# Patient Record
Sex: FEMALE | Race: WHITE | NOT HISPANIC OR LATINO | Employment: UNEMPLOYED | URBAN - METROPOLITAN AREA
[De-identification: names, ages, dates, MRNs, and addresses within clinical notes are randomized per-mention and may not be internally consistent; named-entity substitution may affect disease eponyms.]

---
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
## 2022-03-11 ENCOUNTER — HISTORICAL (OUTPATIENT)
Dept: ADMINISTRATIVE | Facility: HOSPITAL | Age: 22
End: 2022-03-11

## 2022-04-07 ENCOUNTER — HISTORICAL (OUTPATIENT)
Dept: ADMINISTRATIVE | Facility: HOSPITAL | Age: 22
End: 2022-04-07
Payer: MEDICAID

## 2022-04-23 VITALS
HEIGHT: 60 IN | SYSTOLIC BLOOD PRESSURE: 112 MMHG | BODY MASS INDEX: 40.9 KG/M2 | WEIGHT: 208.31 LBS | DIASTOLIC BLOOD PRESSURE: 65 MMHG

## 2022-04-30 NOTE — ED PROVIDER NOTES
Patient:   Pierce Kerr             MRN: [id number]            FIN: [id number]               Age:   19 years     Sex:  Female     :  2000   Associated Diagnoses:   Chest pain; AP (abdominal pain); Bacterial urinary tract infection   Author:   Chantal Delgadillo      Basic Information   Time seen: Date & time 2019 02:02:00.   History source: Patient.   Arrival mode: Private vehicle.   History limitation: None.   Additional information: Chief Complaint from Nursing Triage Note : Chief Complaint   2019 21:59 CDT      Chief Complaint           Presents with c/o left sided chest pain that radiates to left upper abdominal quad. Onset around 2100 tonight while at work  .   Provider/Visit info:   Time Seen:  Chantal Delgadillo / 2019 01:58  .   History of Present Illness   The patient presents with 19-year-old female presents to the ED with the onset of chest pain that radiates to left upper quadrant abdominal pain that occurred around 9 PM.  Denies any nausea, vomiting or diarrhea.  She reports she began to feel dizzy and lightheaded symptoms occurred..  The onset was just prior to arrival.  The course/duration of symptoms is constant.  Radiating pain: none. The character of symptoms is tightness.  The degree at onset was minimal.  The degree at maximum was minimal.  The degree at present is minimal.  The exacerbating factor is none.  The relieving factor is none.  Risk factors consist of none.  Prior episodes: none.  Therapy today None.  Associated symptoms: none.        Review of Systems   Constitutional symptoms:  Negative except as documented in HPI.   Skin symptoms:  Negative except as documented in HPI.   Eye symptoms:  Negative except as documented in HPI.   ENMT symptoms:  Negative except as documented in HPI.   Respiratory symptoms:  Negative except as documented in HPI.   Cardiovascular symptoms:  Chest pain.   Gastrointestinal symptoms:  Abdominal pain.    Genitourinary symptoms:  Negative except as documented in HPI.   Musculoskeletal symptoms:  Negative except as documented in HPI.   Neurologic symptoms:  Negative except as documented in HPI.   Psychiatric symptoms:  Negative except as documented in HPI.   Endocrine symptoms:  Negative except as documented in HPI.   Hematologic/Lymphatic symptoms:  Negative except as documented in HPI.   Allergy/immunologic symptoms:  Negative except as documented in HPI.             Additional review of systems information: All other systems reviewed and otherwise negative.      Health Status   Allergies:    Allergic Reactions (Selected)  Severity Not Documented  Penicillin- Unknown..   Medications:  (Selected)   Documented Medications  Documented  DULoxetine 60 mg oral delayed release capsule: 60 mg = 1 cap(s), Oral, Daily  ESCITALOPRAM 20 MG TABLET: 20 mg = 1 tab(s), Oral, Daily  JUNEL FE 24 TABLET: 1 tab(s), Oral, Daily  LEVOTHYROXIN TAB 50MC mcg = 1 tab(s), Oral, Daily  LORAZEPAM    TAB 0.5M.5 mg = 1 tab(s), Oral, Daily  MIRTAZAPINE  TAB 15MG:   ONDANSETRON  TAB 8MG ODT:   OXCARBAZEPIN TAB 150MG:   OXCARBAZEPIN TAB 300MG:   RISPERIDONE  TAB 1M mg = 1 tab(s), Oral, qPM  escitalopram 10 mg oral tablet: 10 mg = 1 tab(s), Oral, qAM  lamoTRIgine 25 mg oral tablet:   risperidone 0.5 mg oral tablet: 0.5 mg = 1 tab(s), Oral, Daily  risperidone 2 mg oral tablet: 2 mg = 1 tab(s), Oral, Daily  risperidone 3 mg oral tablet: 3 mg = 1 tab(s), Oral, Daily.   Immunizations: Per nurse's notes.   Menstrual history: Last menstrual period: Date 2019.      Past Medical/ Family/ Social History   Medical history: Negative.   Surgical history:    No active procedure history items have been selected or recorded..   Family history: Not significant.   Social history:    Social & Psychosocial Habits    Alcohol  2019  Use: Never    Employment/School  2019  Status: Student    Tobacco  2018  Use: Never smoker    Patient  Wants Consult For Cessation Counseling N/A    02/20/2019  Use: Never (less than 100 in l    Patient Wants Consult For Cessation Counseling N/A    05/02/2019  Use: Never (less than 100 in l    Patient Wants Consult For Cessation Counseling N/A    05/08/2019  Use: Never (less than 100 in l    Patient Wants Consult For Cessation Counseling N/A    08/23/2019  Use: Never (less than 100 in l    Patient Wants Consult For Cessation Counseling N/A    Abuse/Neglect  08/23/2019  SHX Any signs of abuse or neglect No  , Alcohol use: Denies, Tobacco use: Denies, Drug use: Denies, Occupation: Unemployed.      Physical Examination               Vital Signs             Time:  9/14/2019 02:02:00.   Vital Signs   9/14/2019 1:14 CDT       Peripheral Pulse Rate     76 bpm                             Respiratory Rate          18 br/min                             SpO2                      100 %                             Systolic Blood Pressure   118 mmHg                             Diastolic Blood Pressure  79 mmHg                             Mean Arterial Pressure, Cuff              92 mmHg    9/13/2019 21:59 CDT      Temperature Oral          36.8 DegC                             Temperature Oral (calculated)             98.24 DegF                             Peripheral Pulse Rate     88 bpm                             Respiratory Rate          18 br/min                             SpO2                      97 %                             Systolic Blood Pressure   115 mmHg                             Diastolic Blood Pressure  80 mmHg  .   Basic Oxygen Information   9/14/2019 1:14 CDT       SpO2                      100 %    9/13/2019 21:59 CDT      SpO2                      97 %  .   General:  Alert, no acute distress.    Skin:  Warm, pink, intact.    Head:  Normocephalic, atraumatic.    Neck:  Supple, trachea midline, no tenderness.    Cardiovascular:  Regular rate and rhythm, No murmur, Normal peripheral perfusion.    Respiratory:   Lungs are clear to auscultation, respirations are non-labored, breath sounds are equal, Symmetrical chest wall expansion.    Back:  Nontender, Normal range of motion.    Musculoskeletal:  Normal ROM, normal strength.    Chest wall   Gastrointestinal:  Soft, Nontender, Non distended, Normal bowel sounds.    Neurological:  Alert and oriented to person, place, time, and situation, normal sensory observed, normal motor observed, normal speech observed.    Lymphatics:  No lymphadenopathy.   Psychiatric:  Cooperative, appropriate mood & affect, normal judgment.       Medical Decision Making   Documents reviewed:  Emergency department nurses' notes.   Results review:  Lab results : Lab View   9/14/2019 1:19 CDT       U beta hCG Ql POC         Negative    9/14/2019 1:00 CDT       Sodium Lvl                142 mmol/L                             Potassium Lvl             3.7 mmol/L                             Chloride                  106 mmol/L                             CO2                       32.0 mmol/L                             Calcium Lvl               9.1 mg/dL                             Glucose Lvl               98 mg/dL                             BUN                       12.0 mg/dL                             Creatinine                0.76 mg/dL                             Bili Total                0.2 mg/dL                             Bili Direct               0.10 mg/dL                             Bili Indirect             0.10 mg/dL                             AST                       21 unit/L                             ALT                       24 unit/L                             Alk Phos                  81 unit/L                             Total Protein             6.8 gm/dL                             Albumin Lvl               3.90 gm/dL                             Globulin                  2.90 gm/dL                             A/G Ratio                 1.3  NA                              Lipase Lvl                147 unit/L                             WBC                       6.6 x10(3)/mcL                             RBC                       4.39 x10(6)/mcL                             Hgb                       13.4 gm/dL                             Hct                       39.9 %                             Platelet                  218 x10(3)/mcL                             MCV                       90.9 fL                             MCH                       30.5 pg                             MCHC                      33.6 gm/dL                             RDW                       11.8 %                             MPV                       10.5 fL                             Abs Neut                  3.29 x10(3)/mcL                             Neutro Auto               50 %  NA                             Lymph Auto                35 %  NA                             Mono Auto                 11 %  NA                             Eos Auto                  3 %  NA                             Abs Eos                   0.2 x10(3)/mcL                             Basophil Auto             1 %  NA                             Abs Neutro                3.29 x10(3)/mcL                             Abs Lymph                 2.3 x10(3)/mcL                             Abs Mono                  0.7 x10(3)/mcL                             Abs Baso                  0.0 x10(3)/mcL    9/14/2019 0:56 CDT       UA Appear                 CLOUDY                             UA Color                  YELLOW                             UA Spec Grav              1.021                             UA Bili                   Negative                             UA pH                     6.5                             UA Urobilinogen           0.2                             UA Blood                  Negative                             UA Glucose                Negative                             UA Ketones                 Negative                             UA Protein                Negative                             UA Nitrite                Negative                             UA Leuk Est               1+                             UA WBC                    30-40 /HPF                             UA RBC                    5-10 /HPF                             UA Bacteria               1+ /HPF                             UA Squam Epithelial       Few  .   Radiology results:  No acute findings noted on x-ray.      Reexamination/ Reevaluation   Time: 9/14/2019 02:10:00 .   Vital signs   results included from flowsheet : Vital Signs   9/14/2019 1:14 CDT       Peripheral Pulse Rate     76 bpm                             Respiratory Rate          18 br/min                             SpO2                      100 %                             Systolic Blood Pressure   118 mmHg                             Diastolic Blood Pressure  79 mmHg                             Mean Arterial Pressure, Cuff              92 mmHg     Interventions: No leukocytosis noted will place on antibiotic for UTI   She has no acute abdomen appreciated on exam.  He appears nontoxic.  Instructed to  follow-up with her PCP on Monday..      Impression and Plan   Diagnosis   Chest pain ([id number] R07.9)   AP (abdominal pain) ([id number] R10.9)   Bacterial urinary tract infection ([id number] N39.0)   Plan   Condition: Improved, Stable.    Disposition: Discharged: Time  9/14/2019 02:12:00, to home.    Prescriptions: Launch prescriptions   Pharmacy:  Bactrim  mg-160 mg oral tablet (Prescribe): 1 tab(s), Oral, BID, X 10 day(s), # 20 tab(s), 0 Refill(s), Pharmacy: [facility] #[id number]  ketorolac 10 mg oral tablet (Prescribe): 10 mg = 1 tab(s), Oral, q6hr, PRN PRN pain  for pain, X 5 day(s), # 20 tab(s), 0 Refill(s), Pharmacy: [facility] #[id number].    Patient was given the following educational materials: Abdominal Pain, Adult, Easy-to-Read, Nonspecific  Chest Pain, Easy-to-Read.    Follow up with: Take Tylenol/Motrin every 4 hours tip alleviate pain and fever.; Please remove packing in 2-3 days Call for followup appointment with PCP on Monday.    Counseled: Patient, Regarding diagnosis, Regarding diagnostic results, Regarding treatment plan, Regarding prescription, Patient indicated understanding of instructions.

## 2022-05-02 NOTE — HISTORICAL [facility] CERNER
This is a historical note converted from [facility]. Formatting and pictures may have been removed.  Please reference F[facility]pe for original formatting and attached multimedia. Chief Complaint  Dizziness, went to Cardiologist today, has low BP and pulse  History of Present Illness  Patient has had dizziness since last Thursday. Saw Pediatrician on Friday, Dr Ellis, in [place] due to severe abdominal pain. Dx by exam with gastritis and started on Omeprazole 20mg and Carafate.? Abd pain improved by Saturday night. Had EGD when 16 yo, no ulcer then.  Saw Cardiology due to hx of pericardial effusion. It has resolved.  Went to ER at [facility] on Monday. Labs: glucose 89, creatinine 0.4,??ALT 15, AST 18, Hgb 12.4, HCT 36, WBC 6.3 , CRP 9.28 ( usually runs around 6 )  ?  PSHX: [facility], Tonsillectomy  Endo : [place], last labs 3 months ago.  LMP 1 month ago, takes BCP. Normal amount of bleeding with menses.  Review of Systems  See HPI. ?Denies cardiac or respiratory complaints. ?Loose stool yesterday. Very hard stool with bleeding on Saturday.? He denies? complaints.? She does have sinus congestion last several days.  Physical Exam  Vitals & Measurements  HR:?84(Peripheral)? BP:?100/62?  HT:?152?cm? WT:?80.4?kg? BMI:?34.8?  General :?????Well-developed and? nourished, no apparent distress, alert and oriented ?4  HEENT:????? TMs and EACs within normal limits,?edematous?nares with erythema and purulent discharge.? OP with erythema?but no exudate.  Integument :????? Skin is intact with no erythema.? No pustules or vesicles.? No rash or scale. No Lymphadenopathy.? No urticaria.? No abnormal nevi  Neck :?????Supple, no lymphadenopathy, no thyromegaly, no bruits, no jugular venous distention  Cardiovascular :?????? Regular rhythm and rate, no murmurs, radial and dorsal pedal pulses 2+ bilaterally  Respiratory :??????Lungs clear to auscultation bilaterally, no wheezes, no crackles, no rhonchi.? Good air movement  Abdomen :?????? ?NABS, soft,  obese abdomen,?epigastric tenderness, no hepatosplenomegaly, no masses, no guarding or rebound????????????????????????  Ext:??????? No muscle tenderness, joints WNL, FROM, negative SLR, no?CCE  Neuro :? ?????? CN II-XII intact, reflexes 2+ throughout, no motor sensory deficits, negative?cerebellar? tests  Heme :?????? No bruising or petechiae?  Back:??????? no CVAT  Assessment/Plan  1.?Dizziness?R42  ?May be due to #3. ?Encouraged to increase her water intake.  2.?Abdominal pain?R10.9  ?Discussed bland diet.  3.?Sinusitis?J32.9  ?We will prescribe 10 days of Omnicef.  4.?Hypothyroidism?E03.9  ?Okay TSH, FT3, and FT4 today. ?Follow-up to be determined.  5.?IBS (irritable bowel syndrome)?K58.9  ?We will place referral to [facility]  6.?Gastritis?K29.70  H. pylori was negative.??Can increase Omeprazole to 40mg if needed.  Referrals  External Referral, Abdominal pain and IBS symptoms, Dietician, [facility], 05/08/19 15:35:00 CDT, IBS (irritable bowel syndrome)   Problem List/Past Medical History  Ongoing  Obesity  Historical  No qualifying data  Medications  CYCLOBENZAPR TAB 5MG,? ?Not taking  DICLOFEN POT TAB 50MG, 50 mg= 1 tab(s), Oral, BID,? ?Not taking  Diprolene 0.05% topical ointment, 1 golden, TOP, BID, 3 refills,? ?Not taking  DULoxetine 60 mg oral delayed release capsule, 60 mg= 1 cap(s), Oral, Daily  escitalopram 10 mg oral tablet, 10 mg= 1 tab(s), Oral, qAM,? ?Not taking  ESCITALOPRAM 20 MG TABLET, 20 mg= 1 tab(s), Oral, Daily,? ?Not taking  Fioricet oral capsule, See Instructions, PRN, 1 refills  JUNEL FE 24 TABLET, 1 tab(s), Oral, Daily  lamoTRIgine 25 mg oral tablet  LEVOTHYROXIN TAB 50MCG, 50 mcg= 1 tab(s), Oral, Daily  LORAZEPAM TAB 0.5MG, 0.5 mg= 1 tab(s), Oral, Daily  MIRTAZAPINE TAB 15MG  OMEPRAZOLE 40MG CAPSULES, 40 mg= 1 cap(s), Oral, Daily,? ?Not taking  ONDANSETRON TAB 8MG ODT,? ?Not taking  OXCARBAZEPIN TAB 150MG  OXCARBAZEPIN TAB 300MG  Pepcid 20 mg oral tablet, 20 mg= 1 tab(s), Oral, BID, 5  refills,? ?Not taking  Prilosec 20 mg oral DR capsule, 20 mg= 1 cap(s), Oral, Daily  RISPERIDONE TAB 1MG, 1 mg= 1 tab(s), Oral, qPM,? ?Not taking  risperidone 2 mg oral tablet, 2 mg= 1 tab(s), Oral, Daily  Allergies  penicillin?(Unknown)  Social History  Alcohol  Never, 05/02/2019  Tobacco  Never (less than 100 in lifetime), N/A, 05/08/2019  Never (less than 100 in lifetime), N/A, 05/02/2019  Never (less than 100 in lifetime), N/A, 02/20/2019  Never smoker, N/A, 11/28/2018  Health Maintenance  Health Maintenance  ???Pending?(in the next year)  ??? ??OverDue  ??? ? ? ?Adolescent Depression Screening due??01/01/19??and every 1??year(s)  ???Satisfied?(in the past 1 year)  ??? ??Satisfied?  ??? ? ? ?Body Mass Index Check on??05/08/19.??Satisfied by SYSTEM  ??? ? ? ?Influenza Vaccine on??02/20/19.??Satisfied by Nessa TORRE, No ROMAN  ?  ?  Lab Results  Test Name Test Result Date/Time   H pylori Ab Negative 05/08/2019 15:09 CDT

## 2022-05-02 NOTE — HISTORICAL [facility] CERNER
This is a historical note converted from [facility]. Formatting and pictures may have been removed.  Please reference Filipe for original formatting and attached multimedia. Chief Complaint  BACK/RIBS. WORST ON THE RIGHT SIDE WITH NAUSEA  History of Present Illness  Patient with pain in right lower rib area since yesterday. ?Eyes trauma?to ribs. ?Intensity waxes. Had Cholecystectomy ?in March.  She was seen by Dr. Vargas 2 weeks ago and had an EGD on 10-31-19. Scheduled for Colonoscopy next Tuesday.? He ordered an MRI due to possible retained gallstones, waiting on insurance approval. ? Negative H Pylori in May.  BM yesterday didnt effect pain.  Review of Systems  See HPI. ?Denies fever. ?Cough for a few days.  Physical Exam  Vitals & Measurements  HR:?80(Peripheral)? BP:?119/63?  HT:?152?cm? WT:?87.2?kg? BMI:?37.74?  General :?????Well-developed and? nourished, no apparent distress, alert and oriented ?4  HEENT:????? TMs and EACs within normal limits,?nasal mucosa within normal limits?with no discharge,?oropharynx clear  Integument :?????No pustules or vesicles.? No rash or scale. No Lymphadenopathy.??  Neck :?????Supple, no lymphadenopathy, no thyromegaly, no bruits, no jugular venous distention  Cardiovascular :?????? Regular rhythm and rate, no murmurs, radial and dorsal pedal pulses 2+ bilaterally  Respiratory :??????Lungs clear to auscultation bilaterally, no wheezes, no crackles, no rhonchi.? Good air movement  Thorax:?Tender palpation of lower ribs?and intercostal spaces, exacerbated with twisting.? No masses appreciated.  Abdomen :?????? ?NABS, soft, nontender, no hepatosplenomegaly, no masses, no guarding or rebound????????????????????????  Ext:??????? No muscle tenderness, joints WNL, FROM, negative SLR, no?CCE  Heme :?????? No bruising or petechiae?  Back:??????? no CVAT  ?  X-ray ribs: No fracture appreciated.? PA view of the chest shows no infiltrate  Assessment/Plan  1.?Abdominal pain?R10.9  ?Advised  patient to proceed with work-up already scheduled.  2.?Rib pain?R07.81  ?No fracture seen on x-ray. ?No evidence of shingles or infection.? Likely intercostal muscle strain.? Prescribed Flexeril.? Patient to contact us if symptoms worsen.   Problem List/Past Medical History  Ongoing  Fatigue  Obesity  Historical  No qualifying data  Medications  DULoxetine 60 mg oral delayed release capsule, 60 mg= 1 cap(s), Oral, Daily  escitalopram 10 mg oral tablet, 10 mg= 1 tab(s), Oral, qAM  ESCITALOPRAM 20 MG TABLET, 20 mg= 1 tab(s), Oral, Daily  Flexeril 5 mg oral tablet, See Instructions  hyoscyamine 0.125 mg oral tablet, 0.125 mg= 1 tab(s), Oral, QID, PRN  JUNEL FE 24 TABLET, 1 tab(s), Oral, Daily  lamoTRIgine 25 mg oral tablet  LEVOTHYROXIN TAB 50MCG, 50 mcg= 1 tab(s), Oral, Daily  LORAZEPAM TAB 0.5MG, 0.5 mg= 1 tab(s), Oral, Daily  MIRTAZAPINE TAB 15MG  ONDANSETRON TAB 8MG ODT,? ?Not taking  OXCARBAZEPIN TAB 150MG  OXCARBAZEPIN TAB 300MG  RISPERIDONE TAB 1MG, 1 mg= 1 tab(s), Oral, qPM  risperidone 0.5 mg oral tablet, 0.5 mg= 1 tab(s), Oral, Daily  risperidone 2 mg oral tablet, 2 mg= 1 tab(s), Oral, Daily  risperidone 3 mg oral tablet, 3 mg= 1 tab(s), Oral, Daily  selenium sulfide 2.25% topical shampoo, 1 golden, TOP, 2x/Wk, 11 refills  Allergies  penicillin?(Unknown)  Social History  Abuse/Neglect  No, 11/19/2019  No, 10/11/2019  No, 08/23/2019  Alcohol  Never, 05/02/2019  Employment/School  Student, 08/23/2019  Tobacco  Never (less than 100 in lifetime), No, 11/19/2019  Never (less than 100 in lifetime), N/A, 10/11/2019  Never (less than 100 in lifetime), N/A, 08/23/2019  Never (less than 100 in lifetime), N/A, 05/08/2019  Never (less than 100 in lifetime), N/A, 05/02/2019  Never (less than 100 in lifetime), N/A, 02/20/2019  Never smoker, N/A, 11/28/2018  Immunizations  Vaccine Date Status   hepatitis B adult vaccine 08/23/2019 Given   varicella virus vaccine 08/23/2019 Given   varicella virus vaccine 08/23/2019 Recorded    hepatitis B vaccine 08/23/2019 Recorded   meningococcal conjugate vaccine 09/19/2018 Recorded   hepatitis A pediatric vaccine 09/19/2018 Recorded   hepatitis A pediatric vaccine 08/31/2017 Recorded   tetanus/diphtheria/pertussis, acel(Tdap) 06/26/2013 Recorded   meningococcal conjugate vaccine 06/26/2013 Recorded   influenza virus vaccine, live, trivalent 09/22/2011 Recorded   influenza virus vaccine, live, trivalent 10/11/2010 Recorded   influenza virus vaccine, live, trivalent 10/23/2009 Recorded   influenza virus vaccine, live, trivalent 10/25/2007 Recorded   measles/mumps/rubella virus vaccine 10/08/2007 Recorded   poliovirus vaccine, inactivated 09/01/2006 Recorded   measles/mumps/rubella virus vaccine 09/01/2006 Recorded   measles/mumps/rubella virus vaccine 06/03/2004 Recorded   varicella virus vaccine 05/07/2004 Recorded   pneumococcal 7-valent vaccine 05/07/2004 Recorded   hepatitis B pediatric vaccine 05/07/2004 Recorded   hepatitis B vaccine 05/07/2004 Recorded   poliovirus vaccine, inactivated 03/05/2004 Recorded   poliovirus vaccine, inactivated 07/30/2003 Recorded   pneumococcal 7-valent vaccine 07/30/2003 Recorded   poliovirus vaccine, inactivated 2002 Recorded   pneumococcal 7-valent vaccine 2002 Recorded   haemophilus b-hepatitis B vaccine 2002 Recorded   hepatitis B pediatric vaccine 2002 Recorded   hepatitis B pediatric vaccine 2002 Recorded   Health Maintenance  Health Maintenance  ???Pending?(in the next year)  ??? ??OverDue  ??? ? ? ?Adolescent Depression Screening due??01/01/19??and every 1??year(s)  ???Satisfied?(in the past 1 year)  ??? ??Satisfied?  ??? ? ? ?Body Mass Index Check on??11/19/19.??Satisfied by Ina Bearden CMA  ??? ? ? ?Influenza Vaccine on??11/19/19.??Satisfied by Ina Bearden CMA  ?  Lab Results  Test Name Test Result Date/Time   WBC 5.4 x10(3)/mcL 11/19/2019 16:04 CST   Hgb 13.0 gm/dL 11/19/2019 16:04 CST   Hct 36.8 % (Low)  11/19/2019 16:04 CST

## 2022-05-02 NOTE — HISTORICAL [facility] CERNER
This is a historical note converted from [employer]. Formatting and pictures may have been removed.  Please reference Filipe for original formatting and attached multimedia. Chief Complaint  ABD PAIN X 2DAYS  History of Present Illness  Patient with abdominal pain for 4-6 weeks. Worsened last 2 days. Located at 4 spots, BLQ and BUQ.  Had Cholecystectomy in 2019.  EGD , Colonoscopy  with Dr Vargas.? Had erosive gastritis in 2019 and hemorrhoid.  Saw dietician in 2019, never had a SIBO test.  Review of Systems  General:??????????????? Patient reports energy level is fair.??Denies fever,chills, night sweats, fatigue or weakness.  HEENT:?????????????????? Denies vision changes or eye pain.? No sore throat, ear pain, sinus pressure or discharge.  Cardiovascular:??? Denies chest pain, palpitations, dyspnea on exertion, orthopnea.  Respiratory:???????? No cough, wheezing, shortness of breath, or sputum.  GI:????????????????????????? See HPI. ?Denies melena or hematochezia.? Occasional constipation diarrhea.  :??????????????????????? Urinary frequency. No urgency, hematuria, discharge, or incontinence  MS:?????????????????????? Denies myalgias, arthralgias, joint effusion, edema, or weakness  Neuro:????????????????? No headaches, numbness in extremities, tingling, dizziness, or weakness  Psych:?????????????????? Denies anxiety, depression, suicidal or homicidal ideations, or irritability  Endo:???????????????????She would like to recheck thyroid numbers. Has been off levothyroxine since May. Denies polyuria, polydipsia, polyphagia  Heme:?????????????????Would like to have CBC and serum iron levels checked.? Has history of anemia.??No abnormal bleeding or bruising. No lymph node enlargement or pain.  Physical Exam  Vitals & Measurements  HR:?114(Peripheral)? BP:?110/80?  HT:?152.00?cm? WT:?88.500?kg? BMI:?38.31? LMP:?09/11/2020 00:00 CDT?  General: Patient is alert and oriented, NAD  Neck:?Supple,?no LAD,?no  thyromegaly,?no carotid bruits  CV:?Regular rate and rhythm, no murmur  Lungs:?No rhonchi or wheezes or crackles, good air movement  ABD:?Moderate tenderness in bilateral upper and bilateral lower quadrants, no rebound or guarding, no masses, NABS  Back: No CVAT  EXT: 2+ DP and radial pulses bilaterally, no CCE  Skin: Intact  Assessment/Plan  1.?Abdominal pain?R10.9  ?We will check a CMP.? Sent order to respiratory clinic at [facility] in [place]?for a SIBO test.? Follow-up to be determined.  2.?Hypothyroid?E03.9  We will check a TSH, FT3, and FT4.  3.?Anemia?D64.9  ?We will check a CBC  4.?Urinary frequency?R35.0  ?We will check a UA.  Referrals  Clinic Follow-up PRN, 09/17/20 11:44:00 CDT, [facility], Future Order   Problem List/Past Medical History  Ongoing  Fatigue  Hypothyroid  Narcolepsy  Obesity  Historical  No qualifying data  Medications  amitriptyline 25 mg oral tablet, 25 mg= 1 tab(s), Oral, qPM  cyclobenzaprine 5 mg oral tablet, See Instructions,? ?Not taking  diclofenac sodium 25 mg oral delayed release tablet, See Instructions  DULoxetine 60 mg oral delayed release capsule, 60 mg= 1 cap(s), Oral, Daily  ethinyl estradiol-norgestimate triphasic 35 mcg oral tablet, 1 tab(s), Oral, Daily  hyoscyamine 0.125 mg sublingual tablet, 0.125 mg= 1 tab(s), SL, q8hr  JUNEL FE 24 TABLET, 1 tab(s), Oral, Daily  lamoTRIgine 200 mg oral tablet, 200 mg= 1 tab(s), Oral, Daily  lamoTRIgine 25 mg oral tablet, 25 mg= 1 tab(s), Oral, BID,? ?Not taking  LEVOTHYROXIN TAB 50MCG, 50 mcg= 1 tab(s), Oral, Daily  LORAZEPAM TAB 0.5MG, 0.5 mg= 1 tab(s), Oral, Daily,? ?Still taking, not as prescribed: PRN  methocarbamol 500 mg oral tablet, 500 mg= 1 tab(s), Oral, Daily  mirtazapine 7.5 mg oral tablet, 7.5 mg= 1 tab(s), Oral, qPM  modafinil 100 mg oral tablet, 100 mg= 1 tab(s), Oral, qAM, 5 refills  ONDANSETRON TAB 8MG ODT, 8 mg= 1 tab(s), Oral, TID  OXCARBAZEPIN TAB 150MG, 150 mg= 1 tab(s), Oral, BID  OXCARBAZEPIN TAB  300MG, 300 mg= 1 tab(s), Oral, BID  risperidone 0.5 mg oral tablet, 0.5 mg= 1 tab(s), Oral, Daily  risperidone 3 mg oral tablet, 3 mg= 1 tab(s), Oral, Daily  Zithromax Z-Christiano 250 mg oral tablet, See Instructions,? ?Not taking  Allergies  Tape?(Rash)  penicillin?(Unknown)  Social History  Abuse/Neglect  No, 06/08/2020  No, 12/20/2019  Alcohol  Never, 05/02/2019  Employment/School  Student, 08/23/2019  Substance Use  Never, 11/25/2019  Tobacco  Never (less than 100 in lifetime), No, 06/08/2020  Never (less than 100 in lifetime), N/A, 12/20/2019  Immunizations  Vaccine Date Status   hepatitis B adult vaccine 08/23/2019 Given   varicella virus vaccine 08/23/2019 Given   varicella virus vaccine 08/23/2019 Recorded   hepatitis B vaccine 08/23/2019 Recorded   meningococcal conjugate vaccine 09/19/2018 Recorded   hepatitis A pediatric vaccine 09/19/2018 Recorded   hepatitis A pediatric vaccine 08/31/2017 Recorded   tetanus/diphtheria/pertussis, acel(Tdap) 06/26/2013 Recorded   meningococcal conjugate vaccine 06/26/2013 Recorded   influenza virus vaccine, live, trivalent 09/22/2011 Recorded   influenza virus vaccine, live, trivalent 10/11/2010 Recorded   influenza virus vaccine, live, trivalent 10/23/2009 Recorded   influenza virus vaccine, live, trivalent 10/25/2007 Recorded   measles/mumps/rubella virus vaccine 10/08/2007 Recorded   poliovirus vaccine, inactivated 09/01/2006 Recorded   measles/mumps/rubella virus vaccine 09/01/2006 Recorded   measles/mumps/rubella virus vaccine 06/03/2004 Recorded   varicella virus vaccine 05/07/2004 Recorded   pneumococcal 7-valent vaccine 05/07/2004 Recorded   hepatitis B pediatric vaccine 05/07/2004 Recorded   hepatitis B vaccine 05/07/2004 Recorded   poliovirus vaccine, inactivated 03/05/2004 Recorded   poliovirus vaccine, inactivated 07/30/2003 Recorded   pneumococcal 7-valent vaccine 07/30/2003 Recorded   poliovirus vaccine, inactivated 2002 Recorded   pneumococcal 7-valent  vaccine 2002 Recorded   haemophilus b-hepatitis B vaccine 2002 Recorded   hepatitis B pediatric vaccine 2002 Recorded   hepatitis B pediatric vaccine 2002 Recorded   Health Maintenance  Health Maintenance  ???Pending?(in the next year)  ??? ??OverDue  ??? ? ? ?Alcohol Misuse Screening due??01/02/20??and every 1??year(s)  ??? ??Due?  ??? ? ? ?ADL Screening due??09/20/20??and every 1??year(s)  ??? ? ? ?Influenza Vaccine due??09/20/20??and every?  ??? ??Due In Future?  ??? ? ? ?Obesity Screening not due until??01/01/21??and every 1??year(s)  ??? ? ? ?Blood Pressure Screening not due until??09/17/21??and every 1??year(s)  ??? ? ? ?Body Mass Index Check not due until??09/17/21??and every 1??year(s)  ???Satisfied?(in the past 1 year)  ??? ??Satisfied?  ??? ? ? ?Blood Pressure Screening on??09/17/20.??Satisfied by Aleks John  ??? ? ? ?Body Mass Index Check on??09/17/20.??Satisfied by Aleks John  ??? ? ? ?Influenza Vaccine on??12/20/19.??Satisfied by Nancy Jay  ??? ? ? ?Obesity Screening on??09/17/20.??Satisfied by Aleks John  ?

## 2022-05-02 NOTE — HISTORICAL [facility] CERN[facility]
This is a historical note converted from Filipe. Formatting and pictures may have been removed.  Please reference Filipe for original formatting and attached multimedia. Chief Complaint  RIGHT SIDE LOWER ABD PAIN  History of Present Illness  For the past month she has RLQ and LLQ pain. The pain is daily and?intermittent. The pain feels like shes being stabbed with a needle. She has frequent diarrhea that has not worsened or changed in the past month. She denies a fever, n/v, melena/hematochezia.  ?   She is currently being followed by a cardiologist for heart palpitations and intermittent chest pain. She will have an OV in the next month to discuss the results of a halter monitor she wore for the past month.  ?   Received birth control implant around November, 2020. GYN: Dr. Arana at [facility].  ?   She does have a history of SIBO and currently using Xifaxan and metronidazole for treatment.  Had Cholecystectomy in 2019.  EGD , Colonoscopy  with Dr Vargas.? Had erosive gastritis in 2019 and hemorrhoid.  Review of Systems  General:?? Denies weight change.??Denies fever,chills, night sweats, or weakness.  Cardiovascular:?? Denies?dyspnea on exertion, orthopnea.  Respiratory:?? No cough, wheezing, shortness of breath, or sputum.  GI:?? Denies nausea, emesis, constipation, melena, hematochezia  :?? No frequency, urgency, hematuria, discharge, or incontinence  MS:?? Denies myalgias, arthralgias, joint effusion, edema, or weakness  Neuro:?? No headaches, numbness in extremities, tingling, dizziness, or weakness  ?  ?  Physical Exam  Vitals & Measurements  HR:?100(Peripheral)? BP:?116/69?  HT:?152.00?cm? HT:?152?cm? WT:?90.700?kg? WT:?90.7?kg? BMI:?39.26?  General:?? Well-developed and??nourished, no apparent distress, alert and oriented??4.  HEENT:?? PERRLA  Neck:?? Supple, no lymphadenopathy, no thyromegaly, no bruits, no jugular venous distention.  Cardiovascular:?? Regular rhythm and  rate, no murmurs, radial pulses 2+ bilaterally.  Respiratory:?? Lungs clear to auscultation bilaterally, no wheezes, no crackles, no rhonchi.??Good air movement.  Abdomen:??NABS, soft.??Tenderness to RUQ, RLQ, LLQ - tenderness severity varies from mild to moderate, tender points also change in severity throughout abdominal exam. Mild guarding in lower quadrants. No rebound tenderness.?No hepatosplenomegaly, no masses.?  MS:?? No CCE.  Neuro:?? CN II-XII intact_  Psych: Normal affect, patient calm, good historian, patient answers questions?appropriately. Good hygiene.  Heme:? No bruising or petechiae.  ?  Assessment/Plan  1.?Abdominal pain?R10.9  Lab today: CBC, CMP, UA  ER precautions.  Will determine next step in evaluation after review of lab results.  Ordered:  1036F Current tobacco non-user, 05/12/21 14:18:00 CDT  1111F- Medication reconciliation completed within 30 days of an inpatient discharge to home, 05/12/21 14:18:00 CDT  1170F Functional Status Assessment, 05/12/21 14:18:00 CDT  3008F Body Mass Index (BMI), documented, 05/12/21 14:18:00 CDT  3074F Most recent systolic blood pressure, less than 130 mm Hg, 05/12/21 14:18:00 CDT  3078F Most recent diastolic blood pressure, less than 80 mm Hg, 05/12/21 14:18:00 CDT  Clinic Follow-up PRN, 05/12/21 14:18:00 CDT, [facility], Future Order  Office/Outpatient Visit Level 3 Established 04297 PC, Abdominal pain  Diarrhea, [facility], 05/12/21 14:18:00 CDT  ?  2.?Diarrhea?R19.7  ?See #1  Ordered:  1036F Current tobacco non-user, 05/12/21 14:18:00 CDT  1111F- Medication reconciliation completed within 30 days of an inpatient discharge to home, 05/12/21 14:18:00 CDT  1170F Functional Status Assessment, 05/12/21 14:18:00 CDT  3008F Body Mass Index (BMI), documented, 05/12/21 14:18:00 CDT  3074F Most recent systolic blood pressure, less than 130 mm Hg, 05/12/21 14:18:00 CDT  3078F Most recent diastolic blood pressure, less than 80 mm Hg, 05/12/21 14:18:00  CDT  Clinic Follow-up PRN, 05/12/21 14:18:00 CDT, [facility], Future Order  Office/Outpatient Visit Level 3 Established 27545 PC, Abdominal pain  Diarrhea, [facility], 05/12/21 14:18:00 CDT  ?  3.?Hypothyroid?E03.9  ?Lab today: TSH  ?  Referrals  Clinic Follow-up PRN, 05/12/21 14:18:00 CDT, [facility], Future Order   Problem List/Past Medical History  Ongoing  Cholecystitis  Fatigue  History of cholecystectomy  Hypothyroid  Narcolepsy  Obesity  Historical  No qualifying data  Medications  DULoxetine 60 mg oral delayed release capsule, 60 mg= 1 cap(s), Oral, Daily  lamoTRIgine 200 mg oral tablet, 200 mg= 1 tab(s), Oral, Daily  LORAZEPAM TAB 0.5MG, 0.5 mg= 1 tab(s), Oral, Daily,? ?Still taking, not as prescribed: PRN  metroNIDAZOLE 250 mg oral tablet, 250 mg= 1 tab(s), Oral, TID, 1 refills  mirtazapine 15 mg oral tablet, 15 mg= 1 tab(s), Oral, qPM  modafinil 100 mg oral tablet, 100 mg= 1 tab(s), Oral, BID, 5 refills  Nexplanon 68 mg subcutaneous implant, 68 mg= 1 EA, Subcutaneous, Once  risperidone 0.5 mg oral tablet, 0.5 mg= 1 tab(s), Oral, Daily  risperidone 3 mg oral tablet, 3 mg= 1 tab(s), Oral, Daily  Xifaxan 550 mg oral tablet, 550 mg= 1 tab(s), Oral, TID, 1 refills  Allergies  penicillins?(Urticaria)  Adhesive Bandage?(unknown)  Tape?(Rash)  penicillin?(Unknown)  Social History  Abuse/Neglect  No, 05/12/2021  No, 03/25/2021  Alcohol  Never, 05/02/2019  Employment/School  Student, 08/23/2019  Substance Use  Never, 11/25/2019  Tobacco  Never (less than 100 in lifetime), No, 05/12/2021  Never (less than 100 in lifetime), N/A, 03/25/2021  Immunizations  Vaccine Date Status   hepatitis B adult vaccine 08/23/2019 Given   varicella virus vaccine 08/23/2019 Given   varicella virus vaccine 08/23/2019 Recorded   hepatitis B vaccine 08/23/2019 Recorded   meningococcal conjugate vaccine 09/19/2018 Recorded   hepatitis A pediatric vaccine 09/19/2018 Recorded   hepatitis A pediatric vaccine 08/31/2017  Recorded   tetanus/diphtheria/pertussis, acel(Tdap) 06/26/2013 Recorded   meningococcal conjugate vaccine 06/26/2013 Recorded   influenza virus vaccine, live, trivalent 09/22/2011 Recorded   influenza virus vaccine, live, trivalent 10/11/2010 Recorded   influenza virus vaccine, live, trivalent 10/23/2009 Recorded   influenza virus vaccine, live, trivalent 10/25/2007 Recorded   measles/mumps/rubella virus vaccine 10/08/2007 Recorded   poliovirus vaccine, inactivated 09/01/2006 Recorded   measles/mumps/rubella virus vaccine 09/01/2006 Recorded   measles/mumps/rubella virus vaccine 06/03/2004 Recorded   hepatitis B vaccine 05/07/2004 Recorded   varicella virus vaccine 05/07/2004 Recorded   pneumococcal 7-valent vaccine 05/07/2004 Recorded   hepatitis B pediatric vaccine 05/07/2004 Recorded   poliovirus vaccine, inactivated 03/05/2004 Recorded   poliovirus vaccine, inactivated 07/30/2003 Recorded   pneumococcal 7-valent vaccine 07/30/2003 Recorded   poliovirus vaccine, inactivated 2002 Recorded   pneumococcal 7-valent vaccine 2002 Recorded   haemophilus b-hepatitis B vaccine 2002 Recorded   hepatitis B pediatric vaccine 2002 Recorded   hepatitis B pediatric vaccine 2002 Recorded   Health Maintenance  Health Maintenance  ???Pending?(in the next year)  ??? ??OverDue  ??? ? ? ?Influenza Vaccine due??10/01/20??and every 1??day(s)  ??? ? ? ?Alcohol Misuse Screening due??01/02/21??and every 1??year(s)  ??? ??Due?  ??? ? ? ?ADL Screening due??05/13/21??and every 1??year(s)  ??? ??Due In Future?  ??? ? ? ?Obesity Screening not due until??01/01/22??and every 1??year(s)  ??? ? ? ?Depression Screening not due until??03/25/22??and every 1??year(s)  ??? ? ? ?Blood Pressure Screening not due until??05/12/22??and every 1??year(s)  ??? ? ? ?Body Mass Index Check not due until??05/12/22??and every 1??year(s)  ???Satisfied?(in the past 1 year)  ??? ??Satisfied?  ??? ? ? ?Blood Pressure Screening  on??05/12/21.??Satisfied by Ina Bearden CMA  ??? ? ? ?Body Mass Index Check on??05/12/21.??Satisfied by Ina Bearden CMA.  ??? ? ? ?Depression Screening on??03/25/21.??Satisfied by Nancy Jay  ??? ? ? ?Influenza Vaccine on??03/25/21.??Satisfied by Nancy Jay  ??? ? ? ?Obesity Screening on??05/12/21.??Satisfied by Ina Bearden CMA.  ?      Patient condition discussed?in detail with nurse practitioner.??Agree with plan of care?and follow-up.  ?

## 2022-05-02 NOTE — HISTORICAL [facility] CERNER
This is a historical note converted from Filipe. Formatting and pictures may have been removed.  Please reference Filipe for original formatting and attached multimedia. Chief Complaint  REFERRAL FOR SLEEP STUDY, DANDRUFF  History of Present Illness  Patient presents with mother. She is hypersomnolent for months. Falls asleep in car on way to school and in class. Finally wakes up somewhat about 9:30. Father has narcolepsy. Sees Dr Light, psychiatrist, ?at [facility] every 3 months. No psych medication changes in at least a year. Went to ER? on 9-14-9 for abdominal and chest pain. In process of GI work up.  Also, rash on scalp, eyebrows, ears, and nose. Failed OTC meds.  Declines Flu vaccine.  Review of Systems  General:??????????????See HPI.? History of anemia?3 years ago  HEENT:?????????????????? Denies vision changes or eye pain.? No sore throat, ear pain, sinus pressure or discharge.  Cardiovascular:??? Denies chest pain, palpitations, dyspnea on exertion, orthopnea.  Respiratory:???????? No cough, wheezing, shortness of breath, or sputum.  GI:????????????????????????? Denies nausea, emesis, constipation, diarrhea, melena, hematochezia or abdominal pain  :??????????????????????? No frequency, urgency, hematuria, discharge, or incontinence  MS:?????????????????????? Denies myalgias, arthralgias, joint effusion, edema, or weakness  Neuro:????????????????? See HPI  Psych:????????????????Depression is controlled.?? Denies? suicidal or homicidal ideations, or irritability  Endo:??????????????????? Denies polyuria, polydipsia, polyphagia  Heme:????????????????? No abnormal bleeding or bruising. No lymph node enlargement or pain.  Physical Exam  Vitals & Measurements  HR:?92(Peripheral)? BP:?128/60?  HT:?152?cm? WT:?85?kg? BMI:?36.79?  General :?????Well-developed obese white female in no apparent distress, alert and oriented ?4  HEENT:????? TMs and EACs within normal limits,?nasal mucosa within  normal limits?with no discharge,?oropharynx clear  Integument :????? Erythema with scale of scalp,?eyebrows,?nasolabial fold, and ears.? No pustules or vesicles. ?No LAD.  Neck :?????Supple, no lymphadenopathy, no thyromegaly, no bruits, no jugular venous distention  Cardiovascular :?????? Regular rhythm and rate, no murmurs, radial and dorsal pedal pulses 2+ bilaterally  Respiratory :??????Lungs clear to auscultation bilaterally, no wheezes, no crackles, no rhonchi.? Good air movement  Abdomen :?????? ?NABS, soft, nontender, no hepatosplenomegaly, no masses, no guarding or rebound????????????????????????  Ext:??????? No muscle tenderness, joints WNL, FROM, negative SLR, no?CCE  Neuro :? ?????? CN II-XII intact, reflexes 2+ throughout, no motor sensory deficits, negative?cerebellar? tests  Heme :?????? No bruising or petechiae?  Back:??????? no CVAT  Assessment/Plan  1.?Daytime hypersomnolence?G47.19  Patient with symptoms for months.??She has episodes of falling asleep?during the day. ?Her father has a history of narcolepsy. ?We will schedule?home sleep study.  Ordered:  External Referral, Home Sleep Study, 10/11/19 9:25:00 CDT, Daytime hypersomnolence  Fatigue  Office/Outpatient Visit Level 4 Established 67625 , Daytime hypersomnolence  Fatigue  Seborrhea, [facility], 10/11/19 9:28:00 CDT  ?  2.?Fatigue?R53.83  ?Patient has a history of anemia. ?We will check a CBC, TSH, and vitamin D.  Ordered:  External Referral, Home Sleep Study, 10/11/19 9:25:00 CDT, Daytime hypersomnolence  Fatigue  Office/Outpatient Visit Level 4 Established 47924 PC, Daytime hypersomnolence  Fatigue  Seborrhea, [facility], 10/11/19 9:28:00 CDT  Thyroid Stimulating Hormone, Routine collect, 10/11/19 9:30:00 CDT, Blood, Stop date 10/11/19 9:30:00 CDT, Lab Collect, Fatigue, 10/11/19 9:30:00 CDT  Vitamin D, 25-Hydroxy Level, Routine collect, 10/11/19 9:30:00 CDT, Blood, Stop date 10/11/19 9:30:00 CDT, Lab Collect, Fatigue,  10/11/19 9:30:00 CDT  ?  3.?Seborrhea?L21.9  ?Prescribe selenium shampoo 2.25%  Ordered:  Office/Outpatient Visit Level 4 Established 12481 PC, Daytime hypersomnolence  Fatigue  Seborrhea, [facility], 10/11/19 9:28:00 CDT  ?  4.?Bipolar disorder?F31.9  ?No recent medication changes  ?  Orders:  selenium sulfide topical, 1 golden, TOP, 2x/Wk, # 180 mL, 11 Refill(s), Pharmacy: [facility] #[id number]  Clinic Follow-up PRN, 10/11/19 9:28:00 CDT, [facility], Future Order  Referrals  External Referral, Home Sleep Study, 10/11/19 9:25:00 CDT, Daytime hypersomnolence  Fatigue  Bipolar disorder  Clinic Follow-up PRN, 10/11/19 9:28:00 CDT, [facility], Future Order   Problem List/Past Medical History  Ongoing  Obesity  Historical  No qualifying data  Medications  DULoxetine 60 mg oral delayed release capsule, 60 mg= 1 cap(s), Oral, Daily  escitalopram 10 mg oral tablet, 10 mg= 1 tab(s), Oral, qAM  ESCITALOPRAM 20 MG TABLET, 20 mg= 1 tab(s), Oral, Daily  JUNEL FE 24 TABLET, 1 tab(s), Oral, Daily  lamoTRIgine 25 mg oral tablet  LEVOTHYROXIN TAB 50MCG, 50 mcg= 1 tab(s), Oral, Daily  LORAZEPAM TAB 0.5MG, 0.5 mg= 1 tab(s), Oral, Daily  MIRTAZAPINE TAB 15MG  ONDANSETRON TAB 8MG ODT,? ?Not taking  OXCARBAZEPIN TAB 150MG  OXCARBAZEPIN TAB 300MG  RISPERIDONE TAB 1MG, 1 mg= 1 tab(s), Oral, qPM  risperidone 0.5 mg oral tablet, 0.5 mg= 1 tab(s), Oral, Daily  risperidone 2 mg oral tablet, 2 mg= 1 tab(s), Oral, Daily  risperidone 3 mg oral tablet, 3 mg= 1 tab(s), Oral, Daily  selenium sulfide 2.25% topical shampoo, 1 golden, TOP, 2x/Wk, 11 refills  Allergies  penicillin?(Unknown)  Social History  Abuse/Neglect  No, 10/11/2019  No, 08/23/2019  Alcohol  Never, 05/02/2019  Employment/School  Student, 08/23/2019  Tobacco  Never (less than 100 in lifetime), N/A, 10/11/2019  Never (less than 100 in lifetime), N/A, 08/23/2019  Never (less than 100 in lifetime), N/A, 05/08/2019  Never (less than 100 in lifetime), N/A,  05/02/2019  Never (less than 100 in lifetime), N/A, 02/20/2019  Never smoker, N/A, 11/28/2018  Immunizations  Vaccine Date Status   hepatitis B adult vaccine 08/23/2019 Given   varicella virus vaccine 08/23/2019 Given   varicella virus vaccine 08/23/2019 Recorded   hepatitis B vaccine 08/23/2019 Recorded   meningococcal conjugate vaccine 09/19/2018 Recorded   hepatitis A pediatric vaccine 09/19/2018 Recorded   hepatitis A pediatric vaccine 08/31/2017 Recorded   tetanus/diphtheria/pertussis, acel(Tdap) 06/26/2013 Recorded   meningococcal conjugate vaccine 06/26/2013 Recorded   influenza virus vaccine, live, trivalent 09/22/2011 Recorded   influenza virus vaccine, live, trivalent 10/11/2010 Recorded   influenza virus vaccine, live, trivalent 10/23/2009 Recorded   influenza virus vaccine, live, trivalent 10/25/2007 Recorded   measles/mumps/rubella virus vaccine 10/08/2007 Recorded   poliovirus vaccine, inactivated 09/01/2006 Recorded   measles/mumps/rubella virus vaccine 09/01/2006 Recorded   measles/mumps/rubella virus vaccine 06/03/2004 Recorded   varicella virus vaccine 05/07/2004 Recorded   pneumococcal 7-valent vaccine 05/07/2004 Recorded   hepatitis B pediatric vaccine 05/07/2004 Recorded   hepatitis B vaccine 05/07/2004 Recorded   poliovirus vaccine, inactivated 03/05/2004 Recorded   poliovirus vaccine, inactivated 07/30/2003 Recorded   pneumococcal 7-valent vaccine 07/30/2003 Recorded   poliovirus vaccine, inactivated 2002 Recorded   pneumococcal 7-valent vaccine 2002 Recorded   haemophilus b-hepatitis B vaccine 2002 Recorded   hepatitis B pediatric vaccine 2002 Recorded   hepatitis B pediatric vaccine 2002 Recorded   Health Maintenance  Health Maintenance  ???Pending?(in the next year)  ??? ??OverDue  ??? ? ? ?Adolescent Depression Screening due??01/01/19??and every 1??year(s)  ???Satisfied?(in the past 1 year)  ??? ??Satisfied?  ??? ? ? ?Body Mass Index Check  on??10/11/19.??Satisfied by Beatrice Joyner LPN  ??? ? ? ?Influenza Vaccine on??10/11/19.??Satisfied by Beatrice Joyner LPN  ?      Clarification: Patient has excessive daytime sleepiness and hypersomnia

## 2022-05-26 ENCOUNTER — OFFICE VISIT (OUTPATIENT)
Dept: NEUROLOGY | Facility: CLINIC | Age: 22
End: 2022-05-26
Payer: COMMERCIAL

## 2022-05-26 VITALS
SYSTOLIC BLOOD PRESSURE: 96 MMHG | BODY MASS INDEX: 39.08 KG/M2 | WEIGHT: 207 LBS | HEIGHT: 61 IN | DIASTOLIC BLOOD PRESSURE: 68 MMHG

## 2022-05-26 DIAGNOSIS — G47.11 IDIOPATHIC HYPERSOMNIA: Primary | ICD-10-CM

## 2022-05-26 PROCEDURE — 1160F PR REVIEW ALL MEDS BY PRESCRIBER/CLIN PHARMACIST DOCUMENTED: ICD-10-PCS | Mod: CPTII,S$GLB,, | Performed by: NURSE PRACTITIONER

## 2022-05-26 PROCEDURE — 99999 PR PBB SHADOW E&M-EST. PATIENT-LVL III: CPT | Mod: PBBFAC,,, | Performed by: NURSE PRACTITIONER

## 2022-05-26 PROCEDURE — 99213 OFFICE O/P EST LOW 20 MIN: CPT | Mod: S$GLB,,, | Performed by: NURSE PRACTITIONER

## 2022-05-26 PROCEDURE — 99999 PR PBB SHADOW E&M-EST. PATIENT-LVL III: ICD-10-PCS | Mod: PBBFAC,,, | Performed by: NURSE PRACTITIONER

## 2022-05-26 PROCEDURE — 3078F DIAST BP <80 MM HG: CPT | Mod: CPTII,S$GLB,, | Performed by: NURSE PRACTITIONER

## 2022-05-26 PROCEDURE — 1160F RVW MEDS BY RX/DR IN RCRD: CPT | Mod: CPTII,S$GLB,, | Performed by: NURSE PRACTITIONER

## 2022-05-26 PROCEDURE — 99213 PR OFFICE/OUTPT VISIT, EST, LEVL III, 20-29 MIN: ICD-10-PCS | Mod: S$GLB,,, | Performed by: NURSE PRACTITIONER

## 2022-05-26 PROCEDURE — 3078F PR MOST RECENT DIASTOLIC BLOOD PRESSURE < 80 MM HG: ICD-10-PCS | Mod: CPTII,S$GLB,, | Performed by: NURSE PRACTITIONER

## 2022-05-26 PROCEDURE — 1159F PR MEDICATION LIST DOCUMENTED IN MEDICAL RECORD: ICD-10-PCS | Mod: CPTII,S$GLB,, | Performed by: NURSE PRACTITIONER

## 2022-05-26 PROCEDURE — 3008F BODY MASS INDEX DOCD: CPT | Mod: CPTII,S$GLB,, | Performed by: NURSE PRACTITIONER

## 2022-05-26 PROCEDURE — 3074F SYST BP LT 130 MM HG: CPT | Mod: CPTII,S$GLB,, | Performed by: NURSE PRACTITIONER

## 2022-05-26 PROCEDURE — 3074F PR MOST RECENT SYSTOLIC BLOOD PRESSURE < 130 MM HG: ICD-10-PCS | Mod: CPTII,S$GLB,, | Performed by: NURSE PRACTITIONER

## 2022-05-26 PROCEDURE — 1159F MED LIST DOCD IN RCRD: CPT | Mod: CPTII,S$GLB,, | Performed by: NURSE PRACTITIONER

## 2022-05-26 PROCEDURE — 3008F PR BODY MASS INDEX (BMI) DOCUMENTED: ICD-10-PCS | Mod: CPTII,S$GLB,, | Performed by: NURSE PRACTITIONER

## 2022-05-26 RX ORDER — DULOXETIN HYDROCHLORIDE 60 MG/1
60 CAPSULE, DELAYED RELEASE ORAL EVERY MORNING
COMMUNITY
Start: 2022-05-12 | End: 2023-04-04

## 2022-05-26 RX ORDER — ALPRAZOLAM 1 MG/1
1 TABLET ORAL DAILY PRN
COMMUNITY
Start: 2022-02-11 | End: 2023-04-04

## 2022-05-26 RX ORDER — RISPERIDONE 0.5 MG/1
1.5 TABLET ORAL EVERY MORNING
COMMUNITY
Start: 2022-04-30

## 2022-05-26 RX ORDER — MODAFINIL 100 MG/1
100 TABLET ORAL 2 TIMES DAILY
COMMUNITY
Start: 2022-01-27 | End: 2022-05-26 | Stop reason: SDUPTHER

## 2022-05-26 RX ORDER — NABUMETONE 750 MG/1
750 TABLET, FILM COATED ORAL 2 TIMES DAILY PRN
COMMUNITY
Start: 2022-05-05 | End: 2022-06-13

## 2022-05-26 RX ORDER — ATENOLOL 50 MG/1
50 TABLET ORAL EVERY MORNING
COMMUNITY
Start: 2022-05-16 | End: 2023-04-04

## 2022-05-26 RX ORDER — MIRTAZAPINE 15 MG/1
15 TABLET, FILM COATED ORAL NIGHTLY
COMMUNITY
Start: 2022-05-06 | End: 2023-04-04

## 2022-05-26 RX ORDER — LEVOTHYROXINE SODIUM 50 UG/1
50 TABLET ORAL DAILY
COMMUNITY
Start: 2022-05-19 | End: 2022-07-19 | Stop reason: SDUPTHER

## 2022-05-26 RX ORDER — LAMOTRIGINE 200 MG/1
200 TABLET ORAL DAILY
COMMUNITY
Start: 2022-05-12 | End: 2023-04-04

## 2022-05-26 RX ORDER — RISPERIDONE 3 MG/1
3 TABLET ORAL NIGHTLY
COMMUNITY
Start: 2022-05-12

## 2022-05-26 RX ORDER — MODAFINIL 100 MG/1
100 TABLET ORAL 2 TIMES DAILY
Qty: 60 TABLET | Refills: 5 | Status: SHIPPED | OUTPATIENT
Start: 2022-05-26 | End: 2022-07-19

## 2022-05-26 NOTE — PROGRESS NOTES
Subjective:       Patient ID: Antonella Kerr is a 21 y.o. female.    Chief Complaint: Narcolepsy    HPI   On modafinil 100 bid - takes 150mg in the AM  Likes this dose  Increasing to 150mg has not caused any worsening of bipolar symptoms    POTS is stable    No new issues since last seen    Review of Systems    A 14pt ROS was reviewed & is negative unless otherwise documented in the HPI    +POTS  +BPD  Objective:      Physical Exam    GENERAL: NAD, calm, cooperative, appropriate  Awake/alert  Well groomed  RESP: CTAB  HEART: RRR  No LE edema  MENTAL STATUS: oriented, follow commands reliably  SPEECH/LANGUAGE: clear, fluent  CN:  Perrla, eomi, vff, gaze conjugate  No tactile or motor facial asymmetry  Tongue protrudes midline  Motor: no focal weakness  Cerebellar: no tremor or dysmetria  Sensory: normal to tactile stim/vibration  DTRs: normal +2, symmetric  Gait: steady    I, di colby np, certify that dr. Alfred murray the supervising/rendering physician is physically present in the office at the time of the visit    Prior PSG normal  MSLT: short latency; no SOREM  Assessment:       Problem List Items Addressed This Visit        Other    Idiopathic hypersomnia - Primary (Chronic)          Plan:       Cont modafinil 150-200/day  F/u 6m

## 2022-06-13 RX ORDER — NABUMETONE 750 MG/1
TABLET, FILM COATED ORAL
Qty: 60 TABLET | Refills: 0 | Status: SHIPPED | OUTPATIENT
Start: 2022-06-13 | End: 2022-07-19 | Stop reason: SDUPTHER

## 2022-07-19 PROBLEM — E03.9 HYPOTHYROIDISM: Status: ACTIVE | Noted: 2022-07-19

## 2022-07-19 PROBLEM — M54.50 CHRONIC LOW BACK PAIN: Status: ACTIVE | Noted: 2022-07-19

## 2022-07-19 PROBLEM — G89.29 CHRONIC LOW BACK PAIN: Status: ACTIVE | Noted: 2022-07-19

## 2022-07-19 PROCEDURE — 84702 CHORIONIC GONADOTROPIN TEST: CPT | Performed by: FAMILY MEDICINE

## 2022-11-28 ENCOUNTER — PATIENT MESSAGE (OUTPATIENT)
Dept: RESEARCH | Facility: HOSPITAL | Age: 22
End: 2022-11-28
Payer: COMMERCIAL

## 2023-01-20 LAB
ABO AND RH: NORMAL
C TRACH DNA SPEC QL NAA+PROBE: NEGATIVE
HBV SURFACE AG SERPL QL IA: NEGATIVE
HCV AB SERPL QL IA: NEGATIVE
HIV 1+2 AB+HIV1 P24 AG SERPL QL IA: NEGATIVE
N GONORRHOEA DNA SPEC QL NAA+PROBE: NEGATIVE
RUBELLA IMMUNE STATUS: NORMAL
T PALLIDUM AB SER QL: NONREACTIVE

## 2023-04-03 DIAGNOSIS — E03.9 HYPOTHYROIDISM AFFECTING PREGNANCY IN SECOND TRIMESTER: Primary | ICD-10-CM

## 2023-04-03 DIAGNOSIS — O99.342 BIPOLAR DISEASE DURING PREGNANCY IN SECOND TRIMESTER: ICD-10-CM

## 2023-04-03 DIAGNOSIS — F31.9 BIPOLAR DISEASE DURING PREGNANCY IN SECOND TRIMESTER: ICD-10-CM

## 2023-04-03 DIAGNOSIS — O99.282 HYPOTHYROIDISM AFFECTING PREGNANCY IN SECOND TRIMESTER: Primary | ICD-10-CM

## 2023-04-04 ENCOUNTER — PROCEDURE VISIT (OUTPATIENT)
Dept: MATERNAL FETAL MEDICINE | Facility: CLINIC | Age: 23
End: 2023-04-04
Payer: COMMERCIAL

## 2023-04-04 ENCOUNTER — OFFICE VISIT (OUTPATIENT)
Dept: MATERNAL FETAL MEDICINE | Facility: CLINIC | Age: 23
End: 2023-04-04
Payer: COMMERCIAL

## 2023-04-04 VITALS
HEART RATE: 87 BPM | DIASTOLIC BLOOD PRESSURE: 61 MMHG | SYSTOLIC BLOOD PRESSURE: 120 MMHG | BODY MASS INDEX: 37.74 KG/M2 | WEIGHT: 193.25 LBS

## 2023-04-04 DIAGNOSIS — F31.9 BIPOLAR DISEASE DURING PREGNANCY IN SECOND TRIMESTER: ICD-10-CM

## 2023-04-04 DIAGNOSIS — O99.212 OBESITY AFFECTING PREGNANCY IN SECOND TRIMESTER: ICD-10-CM

## 2023-04-04 DIAGNOSIS — E03.9 HYPOTHYROIDISM AFFECTING PREGNANCY IN SECOND TRIMESTER: ICD-10-CM

## 2023-04-04 DIAGNOSIS — O99.342 BIPOLAR DISEASE DURING PREGNANCY IN SECOND TRIMESTER: ICD-10-CM

## 2023-04-04 DIAGNOSIS — G90.A POTS (POSTURAL ORTHOSTATIC TACHYCARDIA SYNDROME): ICD-10-CM

## 2023-04-04 DIAGNOSIS — O99.282 HYPOTHYROIDISM AFFECTING PREGNANCY IN SECOND TRIMESTER: ICD-10-CM

## 2023-04-04 PROCEDURE — 3008F BODY MASS INDEX DOCD: CPT | Mod: CPTII,S$GLB,, | Performed by: OBSTETRICS & GYNECOLOGY

## 2023-04-04 PROCEDURE — 1160F RVW MEDS BY RX/DR IN RCRD: CPT | Mod: CPTII,S$GLB,, | Performed by: OBSTETRICS & GYNECOLOGY

## 2023-04-04 PROCEDURE — 3078F PR MOST RECENT DIASTOLIC BLOOD PRESSURE < 80 MM HG: ICD-10-PCS | Mod: CPTII,S$GLB,, | Performed by: OBSTETRICS & GYNECOLOGY

## 2023-04-04 PROCEDURE — 76811 PR US, OB FETAL EVAL & EXAM, TRANSABDOM,FIRST GESTATION: ICD-10-PCS | Mod: S$GLB,,, | Performed by: OBSTETRICS & GYNECOLOGY

## 2023-04-04 PROCEDURE — 99204 OFFICE O/P NEW MOD 45 MIN: CPT | Mod: 25,S$GLB,, | Performed by: OBSTETRICS & GYNECOLOGY

## 2023-04-04 PROCEDURE — 3074F SYST BP LT 130 MM HG: CPT | Mod: CPTII,S$GLB,, | Performed by: OBSTETRICS & GYNECOLOGY

## 2023-04-04 PROCEDURE — 3074F PR MOST RECENT SYSTOLIC BLOOD PRESSURE < 130 MM HG: ICD-10-PCS | Mod: CPTII,S$GLB,, | Performed by: OBSTETRICS & GYNECOLOGY

## 2023-04-04 PROCEDURE — 3078F DIAST BP <80 MM HG: CPT | Mod: CPTII,S$GLB,, | Performed by: OBSTETRICS & GYNECOLOGY

## 2023-04-04 PROCEDURE — 1159F PR MEDICATION LIST DOCUMENTED IN MEDICAL RECORD: ICD-10-PCS | Mod: CPTII,S$GLB,, | Performed by: OBSTETRICS & GYNECOLOGY

## 2023-04-04 PROCEDURE — 3008F PR BODY MASS INDEX (BMI) DOCUMENTED: ICD-10-PCS | Mod: CPTII,S$GLB,, | Performed by: OBSTETRICS & GYNECOLOGY

## 2023-04-04 PROCEDURE — 99204 PR OFFICE/OUTPT VISIT, NEW, LEVL IV, 45-59 MIN: ICD-10-PCS | Mod: 25,S$GLB,, | Performed by: OBSTETRICS & GYNECOLOGY

## 2023-04-04 PROCEDURE — 76811 OB US DETAILED SNGL FETUS: CPT | Mod: S$GLB,,, | Performed by: OBSTETRICS & GYNECOLOGY

## 2023-04-04 PROCEDURE — 1159F MED LIST DOCD IN RCRD: CPT | Mod: CPTII,S$GLB,, | Performed by: OBSTETRICS & GYNECOLOGY

## 2023-04-04 PROCEDURE — 1160F PR REVIEW ALL MEDS BY PRESCRIBER/CLIN PHARMACIST DOCUMENTED: ICD-10-PCS | Mod: CPTII,S$GLB,, | Performed by: OBSTETRICS & GYNECOLOGY

## 2023-04-04 RX ORDER — LEVOTHYROXINE SODIUM 75 UG/1
75 TABLET ORAL
COMMUNITY
Start: 2023-02-22 | End: 2023-10-05 | Stop reason: SDUPTHER

## 2023-04-04 RX ORDER — BUSPIRONE HYDROCHLORIDE 15 MG/1
15 TABLET ORAL NIGHTLY
COMMUNITY
Start: 2023-03-22

## 2023-04-04 NOTE — PROGRESS NOTES
MATERNAL-FETAL MEDICINE   CONSULT NOTE    Provider requesting consultation: Dr Syed    SUBJECTIVE:     Ms. Antonella Ackerman is a 22 y.o.  female with IUP at 18w3d who is seen in consultation by ABEL for evaluation and management of:  Problem   Hypothyroidism Affecting Pregnancy in Second Trimester   Bipolar Disease During Pregnancy in Second Trimester   Obesity Affecting Pregnancy in Second Trimester   Pots (Postural Orthostatic Tachycardia Syndrome)   Chronic Low Back Pain     She is seen today for multiple medical conditions.  First, she has a history of hypothyroidism and has been managed on Synthroid.  She completed labs in February and in March and reports normal TSH and free T4 (we have records from February).  She is currently maintained on Synthroid 75 mcg.  She is feeling well.  She states that Dr. Syed is planning on checking her levels monthly.    She has a history of bipolar disorder and anxiety.  Prior to pregnancy she was taking valproic acid but discontinued use in 2022.  She is currently taking Risperdal 3 mg Q morning and BuSpar 15 mg at night.  She has difficulty sleeping and has tried Unisom and melatonin without much relief.  She says she is doing well overall and also sees a counselor.  She hopes to restart her valproic acid after pregnancy because it was better at managing her symptoms.    She has lost about 13 lb in this pregnancy documented.  She is struggling with food aversions and nausea, especially to protein sources.  Overall, her symptoms are improving and she is able to eat a relatively reassuring amount of food daily.    She has a diagnosis of POTS made in 2022 by Dr. Villatoro.  She previously was taking atenolol but stopped this medication when she was trying to conceive.  She has been able to manage the symptoms in pregnancy conservatively with increasing hydration and sodium intake.    She reports chronic low back pain and has never been truly  evaluated for this.  She has some tingling sensation in her right foot and sometimes has shooting pains down that leg.  Currently she denies having any significant symptoms.  She has seen a chiropractor in the past without much improvement.  She has not sought care for this at with any other clinician.         Medication List with Changes/Refills   Current Medications    BUSPIRONE (BUSPAR) 15 MG TABLET    Take 15 mg by mouth every evening.    LEVOTHYROXINE (SYNTHROID) 75 MCG TABLET    Take 75 mcg by mouth.    MELATONIN 10 MG CHEW    Takes 15 mg qhs    RISPERIDONE (RISPERDAL) 0.5 MG TAB    Take 1.5 mg by mouth every morning.    RISPERIDONE (RISPERDAL) 3 MG TAB    Take 3 mg by mouth nightly.   Discontinued Medications    ALPRAZOLAM (XANAX) 1 MG TABLET    Take 1 mg by mouth daily as needed.    ATENOLOL (TENORMIN) 50 MG TABLET    Take 50 mg by mouth every morning.    DIVALPROEX (DEPAKOTE) 250 MG EC TABLET    Take 250 mg by mouth 2 (two) times daily.    DULOXETINE (CYMBALTA) 60 MG CAPSULE    Take 60 mg by mouth every morning.    LAMOTRIGINE (LAMICTAL) 200 MG TABLET    Take 200 mg by mouth once daily.    LEVOTHYROXINE (SYNTHROID) 50 MCG TABLET    Take 1 tablet (50 mcg total) by mouth once daily.    MIRTAZAPINE (REMERON) 15 MG TABLET    Take 15 mg by mouth nightly.    NABUMETONE (RELAFEN) 750 MG TABLET    TAKE ONE TABLET BY MOUTH TWICE A DAY AS NEEDED FOR PAIN with food    ONDANSETRON (ZOFRAN) 8 MG TABLET    Take 1 tablet (8 mg total) by mouth every 8 (eight) hours as needed for Nausea.       Review of patient's allergies indicates:   Allergen Reactions    Adhesive tape-silicones Blisters    Penicillins Hives     Other reaction(s): Unknown    Adhesive     Apple juice        PMH:  Past Medical History:   Diagnosis Date    Bipolar disorder, unspecified     Hypothyroidism, unspecified     Narcolepsy     POTS (postural orthostatic tachycardia syndrome)        PObHx:  OB History    Para Term  AB Living   1              SAB IAB Ectopic Multiple Live Births                  # Outcome Date GA Lbr Artis/2nd Weight Sex Delivery Anes PTL Lv   1 Current                PSH:  Past Surgical History:   Procedure Laterality Date    CHOLECYSTECTOMY      age 17    TONSILLECTOMY, ADENOIDECTOMY      TYMPANOSTOMY TUBE PLACEMENT         Family history:family history includes Brain cancer in her brother; Hyperlipidemia in her father and mother; Hypertension in her father; Hypothyroidism in her mother.    Social history: reports that she has never smoked. She has never used smokeless tobacco. She reports that she does not currently use alcohol. She reports that she does not use drugs.    Genetic history:  The patient denies any inherited genetic diseases or birth defects in herself or her partner's personal history or family.    Objective:   /61 (BP Location: Right arm)   Pulse 87   Wt 87.6 kg (193 lb 3.7 oz)   BMI 37.74 kg/m²     Ultrasound performed. See viewpoint for full ultrasound report.    A detailed fetal anatomic ultrasound examination was performed for the following high risk indication: Teratogen exposure, obesity.   No fetal structural malformations are identified; however, fetal imaging is incomplete today for several areas (see report). Imaging quality is poor secondary to maternal habitus.   A follow-up study will be scheduled to complete the fetal anatomic survey.   Fetal size today is consistent with established gestational age.   Cervical length by TA scanning is normal.   Placental location is anterior without evidence of previa.     ASSESSMENT/PLAN:     22 y.o.  female with IUP at 18w3d     Hypothyroidism affecting pregnancy in second trimester  Thyroid requirements increase in pregnancy due to increases in metabolism and thyroid binding globulin. Thyroid function tests should be followed closely to ensure adequate treatment.  Hypothyroidism has been associated with higher pregnancy complication rates including  miscarriage, preeclampsia, abruption, low birth weight and stillbirth. Untreated hypothyroidism has also been associated with adverse fetal neurological development, but well treated hypothyroidism (i.e., euthyroid state) carries an excellent prognosis for mother and baby.      2/14/23- TSH 2.4, FT4 1.59.   She reports having labs evaluated, again, in March and were normal, per her report.    Recommendations:  Medication management:  Continue Synthroid at the current dose of 75 micrograms/day   Up to 1/3 of women may require increased hormone replacement in the first trimester, thus consider an empiric 25% increase in medication dose at pregnancy confirmation, while discharging postpartum patients on their pre-pregnancy dose.  Laboratory evaluation:   TSH should be monitored at least every trimester, or every 4 weeks after any medication adjustment. Adjust treatment as necessary to maintain TSH goal < 2.5 mIU/L.      Bipolar disease during pregnancy in second trimester  She reports a history of bipolar and is taking Risperdal 1.5mg QAM and 3mg QPM daily. She is also taking Buspar. She reports improvement of symptoms with the utilization of this medication regimen. Discussed increased risk for peripartum and postpartum mood disorders. Precautions provided.  She is established with a mental health provider and has follow up appointments scheduled Q3 months. She was on Depakote in the past and discontinued in October of 2022. LMP at the end of November 2022. She plans to restart this regimen after delivery under the direction of her mental health provider.    We have discussed the importance of optimization of mental health conditions during pregnancy in an effort to reduce the risk of postpartum mood disorders. We have discussed options for management including psychotherapy, counseling, cognitive behavioral therapy, exercise/yoga, journaling, and psychiatry services. She will notify our office if she is interested in  being referred for any of the above.      Obesity affecting pregnancy in second trimester  The patient was counseled on the  risks associated with obesity which include and increased risk of hypertension, preeclampsia, gestational diabetes, venous thromboembolic disease,  delivery, macrosomia (with resultant shoulder dystocia, obstetric sphincter injury), IUFD, longer first stage of labor, decreased TOLAC success rate, emergent & scheduled  & complications of  (prolonged OR time, delayed delivery, excessive EBL, infection, wound separation/infection, higher spinal failure rate, more difficult intubation).  Obesity is also associated with fetal anomalies, specifically neural tube defects.  Studies have shown that the rate of complication increases with rising BMI and thus pregnancies with maternal morbid obesity are at increased risk.      BMI 37    Recommendations:  Discussed that TWG goal is 11-20 lbs  Screen for signs/symptoms of obstructive sleep apnea  Nutritionist consult offered (this is to be ordered by primary OB provider)  Consider early 1 hr GCT (between 14-16 weeks gestation); repeat at 24-28 weeks gestation  Start low dose aspirin 81 mg daily at 12-16 weeks for preeclampsia risk reduction  Targeted anatomical survey scheduled at 18-20 weeks  Fetal growth ultrasound at 32 weeks if BMI >= 40  Lovenox 40 mg BID for VTE prophylaxis while admitted to the hospital (antepartum or postpartum) if BMI >= 40.   Encouraged breastfeeding  Postpartum lifestyle modifications & weight loss      POTS (postural orthostatic tachycardia syndrome)  She was previously diagnosed with POTS and has been followed by Dr Villatoro, cardiologist. She has routine appts annually and was last evaluated in 2022. She was taking Atenolol prior to pregnancy but discontinued in October.    She reports relatively stable symptoms since becoming pregnant and being without medication. She states every  now and then she has a flare up of high heart rate and low blood pressure. When she experiences these exacerbations, she increases sodium and water intake which helps a little. She denies severe symptoms or need for starting medication during pregnancy.      FOLLOW UP:   F/u in 4 weeks for US/MFM visit    This consultation was completed with the assistance of Zenaida Lynn NP.      Yamila Dhillon  Maternal-Fetal Medicine    Electronically Signed by Yamila Dhillon April 4, 2023

## 2023-04-04 NOTE — ASSESSMENT & PLAN NOTE
She reports a history of bipolar and is taking Risperdal 1.5mg QAM and 3mg QPM daily. She is also taking Buspar. She reports improvement of symptoms with the utilization of this medication regimen. Discussed increased risk for peripartum and postpartum mood disorders. Precautions provided.  She is established with a mental health provider and has follow up appointments scheduled Q3 months. She was on Depakote in the past and discontinued in October of 2022. LMP at the end of November 2022. She plans to restart this regimen after delivery under the direction of her mental health provider.    We have discussed the importance of optimization of mental health conditions during pregnancy in an effort to reduce the risk of postpartum mood disorders. We have discussed options for management including psychotherapy, counseling, cognitive behavioral therapy, exercise/yoga, journaling, and psychiatry services. She will notify our office if she is interested in being referred for any of the above.

## 2023-04-04 NOTE — ASSESSMENT & PLAN NOTE
She was previously diagnosed with POTS and has been followed by Dr Villatoro, cardiologist. She has routine appts annually and was last evaluated in October of 2022. She was taking Atenolol prior to pregnancy but discontinued in October.    She reports relatively stable symptoms since becoming pregnant and being without medication. She states every now and then she has a flare up of high heart rate and low blood pressure. When she experiences these exacerbations, she increases sodium and water intake which helps a little. She denies severe symptoms or need for starting medication during pregnancy.

## 2023-04-04 NOTE — ASSESSMENT & PLAN NOTE
The patient was counseled on the  risks associated with obesity which include and increased risk of hypertension, preeclampsia, gestational diabetes, venous thromboembolic disease,  delivery, macrosomia (with resultant shoulder dystocia, obstetric sphincter injury), IUFD, longer first stage of labor, decreased TOLAC success rate, emergent & scheduled  & complications of  (prolonged OR time, delayed delivery, excessive EBL, infection, wound separation/infection, higher spinal failure rate, more difficult intubation).  Obesity is also associated with fetal anomalies, specifically neural tube defects.  Studies have shown that the rate of complication increases with rising BMI and thus pregnancies with maternal morbid obesity are at increased risk.      BMI 37    Recommendations:   Discussed that TWG goal is 11-20 lbs   Screen for signs/symptoms of obstructive sleep apnea   Nutritionist consult offered (this is to be ordered by primary OB provider)   Consider early 1 hr GCT (between 14-16 weeks gestation); repeat at 24-28 weeks gestation   Start low dose aspirin 81 mg daily at 12-16 weeks for preeclampsia risk reduction   Targeted anatomical survey scheduled at 18-20 weeks   Fetal growth ultrasound at 32 weeks if BMI >= 40   Lovenox 40 mg BID for VTE prophylaxis while admitted to the hospital (antepartum or postpartum) if BMI >= 40.    Encouraged breastfeeding   Postpartum lifestyle modifications & weight loss

## 2023-04-04 NOTE — ASSESSMENT & PLAN NOTE
Thyroid requirements increase in pregnancy due to increases in metabolism and thyroid binding globulin. Thyroid function tests should be followed closely to ensure adequate treatment.  Hypothyroidism has been associated with higher pregnancy complication rates including miscarriage, preeclampsia, abruption, low birth weight and stillbirth. Untreated hypothyroidism has also been associated with adverse fetal neurological development, but well treated hypothyroidism (i.e., euthyroid state) carries an excellent prognosis for mother and baby.      2/14/23- TSH 2.4, FT4 1.59.   She reports having labs evaluated, again, in March and were normal, per her report.    Recommendations:  Medication management:   Continue Synthroid at the current dose of 75 micrograms/day    Up to 1/3 of women may require increased hormone replacement in the first trimester, thus consider an empiric 25% increase in medication dose at pregnancy confirmation, while discharging postpartum patients on their pre-pregnancy dose.  Laboratory evaluation:   TSH should be monitored at least every trimester, or every 4 weeks after any medication adjustment. Adjust treatment as necessary to maintain TSH goal < 2.5 mIU/L.

## 2023-04-11 ENCOUNTER — HOSPITAL ENCOUNTER (EMERGENCY)
Facility: HOSPITAL | Age: 23
Discharge: HOME | End: 2023-04-12
Attending: EMERGENCY MEDICINE
Payer: COMMERCIAL

## 2023-04-11 DIAGNOSIS — O47.9 BRAXTON HICKS CONTRACTIONS: Primary | ICD-10-CM

## 2023-04-11 PROCEDURE — 96360 HYDRATION IV INFUSION INIT: CPT

## 2023-04-11 PROCEDURE — 99284 EMERGENCY DEPT VISIT MOD MDM: CPT | Mod: 25

## 2023-04-12 VITALS
RESPIRATION RATE: 16 BRPM | DIASTOLIC BLOOD PRESSURE: 60 MMHG | BODY MASS INDEX: 37.5 KG/M2 | TEMPERATURE: 98 F | HEIGHT: 60 IN | OXYGEN SATURATION: 100 % | WEIGHT: 191 LBS | SYSTOLIC BLOOD PRESSURE: 110 MMHG | HEART RATE: 63 BPM

## 2023-04-12 LAB
ALBUMIN SERPL-MCNC: 2.9 G/DL (ref 3.5–5)
ALBUMIN/GLOB SERPL: 1 RATIO (ref 1.1–2)
ALP SERPL-CCNC: 49 UNIT/L (ref 40–150)
ALT SERPL-CCNC: 8 UNIT/L (ref 0–55)
AST SERPL-CCNC: 22 UNIT/L (ref 5–34)
BASOPHILS # BLD AUTO: 0.02 X10(3)/MCL (ref 0–0.2)
BASOPHILS NFR BLD AUTO: 0.3 %
BILIRUBIN DIRECT+TOT PNL SERPL-MCNC: 0.4 MG/DL
BUN SERPL-MCNC: 3 MG/DL (ref 7–18.7)
CALCIUM SERPL-MCNC: 9.2 MG/DL (ref 8.4–10.2)
CHLORIDE SERPL-SCNC: 108 MMOL/L (ref 98–107)
CO2 SERPL-SCNC: 21 MMOL/L (ref 22–29)
CREAT SERPL-MCNC: 0.54 MG/DL (ref 0.55–1.02)
EOSINOPHIL # BLD AUTO: 0.09 X10(3)/MCL (ref 0–0.9)
EOSINOPHIL NFR BLD AUTO: 1.3 %
ERYTHROCYTE [DISTWIDTH] IN BLOOD BY AUTOMATED COUNT: 12.1 % (ref 11.5–17)
GFR SERPLBLD CREATININE-BSD FMLA CKD-EPI: >60 MLS/MIN/1.73/M2
GLOBULIN SER-MCNC: 2.8 GM/DL (ref 2.4–3.5)
GLUCOSE SERPL-MCNC: 98 MG/DL (ref 74–100)
HCT VFR BLD AUTO: 31.1 % (ref 37–47)
HGB BLD-MCNC: 11.1 G/DL (ref 12–16)
IMM GRANULOCYTES # BLD AUTO: 0.04 X10(3)/MCL (ref 0–0.04)
IMM GRANULOCYTES NFR BLD AUTO: 0.6 %
LIPASE SERPL-CCNC: 17 U/L
LYMPHOCYTES # BLD AUTO: 1.83 X10(3)/MCL (ref 0.6–4.6)
LYMPHOCYTES NFR BLD AUTO: 26 %
MCH RBC QN AUTO: 30.1 PG (ref 27–31)
MCHC RBC AUTO-ENTMCNC: 35.7 G/DL (ref 33–36)
MCV RBC AUTO: 84.3 FL (ref 80–94)
MONOCYTES # BLD AUTO: 0.53 X10(3)/MCL (ref 0.1–1.3)
MONOCYTES NFR BLD AUTO: 7.5 %
NEUTROPHILS # BLD AUTO: 4.54 X10(3)/MCL (ref 2.1–9.2)
NEUTROPHILS NFR BLD AUTO: 64.3 %
NRBC BLD AUTO-RTO: 0 %
PLATELET # BLD AUTO: 158 X10(3)/MCL (ref 130–400)
PMV BLD AUTO: 11.7 FL (ref 7.4–10.4)
POTASSIUM SERPL-SCNC: 3.5 MMOL/L (ref 3.5–5.1)
PROT SERPL-MCNC: 5.7 GM/DL (ref 6.4–8.3)
RBC # BLD AUTO: 3.69 X10(6)/MCL (ref 4.2–5.4)
SODIUM SERPL-SCNC: 137 MMOL/L (ref 136–145)
WBC # SPEC AUTO: 7.1 X10(3)/MCL (ref 4.5–11.5)

## 2023-04-12 PROCEDURE — 80053 COMPREHEN METABOLIC PANEL: CPT | Performed by: EMERGENCY MEDICINE

## 2023-04-12 PROCEDURE — 83690 ASSAY OF LIPASE: CPT | Performed by: EMERGENCY MEDICINE

## 2023-04-12 PROCEDURE — 25000003 PHARM REV CODE 250: Performed by: EMERGENCY MEDICINE

## 2023-04-12 PROCEDURE — 85025 COMPLETE CBC W/AUTO DIFF WBC: CPT | Performed by: EMERGENCY MEDICINE

## 2023-04-12 RX ADMIN — SODIUM CHLORIDE 1000 ML: 9 INJECTION, SOLUTION INTRAVENOUS at 12:04

## 2023-04-12 NOTE — ED PROVIDER NOTES
"Encounter Date: 4/11/2023       History     Chief Complaint   Patient presents with    Abdominal Cramping     Upper abdominal cramping intermittently since this AM. Cramping started spreading to lower abdomen and upper and lower back 1-2 hours pta. Cramping is a 7/10 pain. Pt is 19 weeks and 3 days pregnant. OB/GYN is Dr. Syed. Pt is high risk pregnancy for hypothyroidism. No vaginal spotting or bleeding at this time.     Patient is a 22-year-old female who presents today with chief complaint of "I want to make sure I am not in labor. "Patient is 19 weeks and 3 days pregnant.  She reports that she is been having some bilateral upper cramping all day long.  About 1 hour prior to arrival it started to involve the bilateral lower abdomen and the back.  She denies any vaginal bleeding, vaginal discharge, vomiting, fever.  She does report 1 episode of diarrhea today.  This is her 1st pregnancy.  She is followed by OBGYN and she states that she has had a normal ultrasound during her pregnancy.  Single IUP per patient.    Review of patient's allergies indicates:   Allergen Reactions    Adhesive tape-silicones Blisters    Penicillins Hives     Other reaction(s): Unknown    Adhesive     Apple juice      Past Medical History:   Diagnosis Date    Bipolar disorder, unspecified     Hypothyroidism, unspecified     Narcolepsy     POTS (postural orthostatic tachycardia syndrome)      Past Surgical History:   Procedure Laterality Date    CHOLECYSTECTOMY      age 17    TONSILLECTOMY, ADENOIDECTOMY      TYMPANOSTOMY TUBE PLACEMENT       Family History   Problem Relation Age of Onset    Hypothyroidism Mother     Hyperlipidemia Mother     Hyperlipidemia Father     Hypertension Father     Brain cancer Brother         Tumor     Social History     Tobacco Use    Smoking status: Never    Smokeless tobacco: Never   Substance Use Topics    Alcohol use: Not Currently    Drug use: Never     Review of Systems   Constitutional:  Negative for " chills, diaphoresis and fever.   HENT:  Negative for congestion, rhinorrhea and sore throat.    Eyes:  Negative for pain, redness and visual disturbance.   Respiratory:  Negative for cough and shortness of breath.    Cardiovascular:  Negative for chest pain, palpitations and leg swelling.   Gastrointestinal:  Positive for abdominal pain (All 4 quadrants described as cramping) and diarrhea (x1). Negative for constipation, nausea and vomiting.   Genitourinary:  Negative for dysuria, hematuria, pelvic pain, vaginal bleeding and vaginal discharge.   Musculoskeletal:  Negative for arthralgias and joint swelling.   Skin:  Negative for rash and wound.   Neurological:  Negative for seizures, syncope and headaches.   All other systems reviewed and are negative.    Physical Exam     Initial Vitals [04/11/23 2340]   BP Pulse Resp Temp SpO2   108/77 80 16 98.3 °F (36.8 °C) 98 %      MAP       --         Physical Exam    Nursing note and vitals reviewed.  Constitutional: She appears well-developed and well-nourished. No distress.   HENT:   Head: Normocephalic and atraumatic.   Mouth/Throat: Oropharynx is clear and moist.   Eyes: Conjunctivae and EOM are normal. Pupils are equal, round, and reactive to light.   Neck: Neck supple. No tracheal deviation present.   Cardiovascular:  Normal rate, regular rhythm, normal heart sounds and intact distal pulses.           Pulmonary/Chest: Breath sounds normal. No respiratory distress.   Abdominal: Abdomen is soft. She exhibits no distension. There is no abdominal tenderness. There is no rebound and no guarding.   Genitourinary:    Genitourinary Comments: FHT 150s.  Cervix closed     Musculoskeletal:         General: No tenderness or edema. Normal range of motion.      Cervical back: Neck supple.     Neurological: She is alert and oriented to person, place, and time. GCS score is 15. GCS eye subscore is 4. GCS verbal subscore is 5. GCS motor subscore is 6.   No focal deficits   Skin: Skin  is warm. No rash noted. No erythema.   Psychiatric: She has a normal mood and affect. Her behavior is normal.       ED Course   Procedures  Labs Reviewed   COMPREHENSIVE METABOLIC PANEL - Abnormal; Notable for the following components:       Result Value    Chloride 108 (*)     Carbon Dioxide 21 (*)     Blood Urea Nitrogen 3.0 (*)     Creatinine 0.54 (*)     Protein Total 5.7 (*)     Albumin Level 2.9 (*)     Albumin/Globulin Ratio 1.0 (*)     All other components within normal limits   CBC WITH DIFFERENTIAL - Abnormal; Notable for the following components:    RBC 3.69 (*)     Hgb 11.1 (*)     Hct 31.1 (*)     MPV 11.7 (*)     All other components within normal limits   LIPASE - Normal   CBC W/ AUTO DIFFERENTIAL    Narrative:     The following orders were created for panel order CBC auto differential.  Procedure                               Abnormality         Status                     ---------                               -----------         ------                     CBC with Differential[[id number]]        Abnormal            Final result                 Please view results for these tests on the individual orders.     Results for orders placed or performed during the hospital encounter of 04/11/23   Comprehensive metabolic panel   Result Value Ref Range    Sodium Level 137 136 - 145 mmol/L    Potassium Level 3.5 3.5 - 5.1 mmol/L    Chloride 108 (H) 98 - 107 mmol/L    Carbon Dioxide 21 (L) 22 - 29 mmol/L    Glucose Level 98 74 - 100 mg/dL    Blood Urea Nitrogen 3.0 (L) 7.0 - 18.7 mg/dL    Creatinine 0.54 (L) 0.55 - 1.02 mg/dL    Calcium Level Total 9.2 8.4 - 10.2 mg/dL    Protein Total 5.7 (L) 6.4 - 8.3 gm/dL    Albumin Level 2.9 (L) 3.5 - 5.0 g/dL    Globulin 2.8 2.4 - 3.5 gm/dL    Albumin/Globulin Ratio 1.0 (L) 1.1 - 2.0 ratio    Bilirubin Total 0.4 <=1.5 mg/dL    Alkaline Phosphatase 49 40 - 150 unit/L    Alanine Aminotransferase 8 0 - 55 unit/L    Aspartate Aminotransferase 22 5 - 34 unit/L    eGFR >60  mls/min/1.73/m2   Lipase   Result Value Ref Range    Lipase Level 17 <=60 U/L   CBC with Differential   Result Value Ref Range    WBC 7.1 4.5 - 11.5 x10(3)/mcL    RBC 3.69 (L) 4.20 - 5.40 x10(6)/mcL    Hgb 11.1 (L) 12.0 - 16.0 g/dL    Hct 31.1 (L) 37.0 - 47.0 %    MCV 84.3 80.0 - 94.0 fL    MCH 30.1 27.0 - 31.0 pg    MCHC 35.7 33.0 - 36.0 g/dL    RDW 12.1 11.5 - 17.0 %    Platelet 158 130 - 400 x10(3)/mcL    MPV 11.7 (H) 7.4 - 10.4 fL    Neut % 64.3 %    Lymph % 26.0 %    Mono % 7.5 %    Eos % 1.3 %    Basophil % 0.3 %    Lymph # 1.83 0.6 - 4.6 x10(3)/mcL    Neut # 4.54 2.1 - 9.2 x10(3)/mcL    Mono # 0.53 0.1 - 1.3 x10(3)/mcL    Eos # 0.09 0 - 0.9 x10(3)/mcL    Baso # 0.02 0 - 0.2 x10(3)/mcL    IG# 0.04 0 - 0.04 x10(3)/mcL    IG% 0.6 %    NRBC% 0.0 %            Imaging Results    None          Medications   sodium chloride 0.9% bolus 1,000 mL 1,000 mL (1,000 mLs Intravenous New Bag 4/12/23 0012)     Medical Decision Making:   History:   Old Medical Records: I decided to obtain old medical records.  Old Records Summarized: records from clinic visits.       <> Summary of Records: Anatomy ultrasound scan from April 4th reviewed.  No significant abnormalities  Clinical Tests:   Lab Tests: Ordered and Reviewed  The following lab test(s) were unremarkable: CBC, CMP and Lipase  ED Management:  Symptoms resolved after IV fluids.                        Clinical Impression:   Final diagnoses:  [O47.9] Guero Friedman contractions (Primary)        ED Disposition Condition    Discharge Stable          ED Prescriptions    None       Follow-up Information       Follow up With Specialties Details Why Contact Info    Follow-up with your OBGYN        [facility] - Emergency Dept Emergency Medicine  As needed, If symptoms worsen [address] [facility] [address]  [phone]             Dylon Nagel MD  04/12/23 0105

## 2023-05-01 DIAGNOSIS — F31.9 BIPOLAR DISEASE DURING PREGNANCY IN SECOND TRIMESTER: ICD-10-CM

## 2023-05-01 DIAGNOSIS — O99.342 BIPOLAR DISEASE DURING PREGNANCY IN SECOND TRIMESTER: ICD-10-CM

## 2023-05-01 DIAGNOSIS — O99.282 HYPOTHYROIDISM AFFECTING PREGNANCY IN SECOND TRIMESTER: Primary | ICD-10-CM

## 2023-05-01 DIAGNOSIS — E03.9 HYPOTHYROIDISM AFFECTING PREGNANCY IN SECOND TRIMESTER: Primary | ICD-10-CM

## 2023-05-01 DIAGNOSIS — G90.A POTS (POSTURAL ORTHOSTATIC TACHYCARDIA SYNDROME): ICD-10-CM

## 2023-05-01 DIAGNOSIS — O99.212 OBESITY AFFECTING PREGNANCY IN SECOND TRIMESTER: ICD-10-CM

## 2023-05-02 ENCOUNTER — OFFICE VISIT (OUTPATIENT)
Dept: MATERNAL FETAL MEDICINE | Facility: CLINIC | Age: 23
End: 2023-05-02
Payer: COMMERCIAL

## 2023-05-02 ENCOUNTER — PROCEDURE VISIT (OUTPATIENT)
Dept: MATERNAL FETAL MEDICINE | Facility: CLINIC | Age: 23
End: 2023-05-02
Payer: COMMERCIAL

## 2023-05-02 VITALS
SYSTOLIC BLOOD PRESSURE: 110 MMHG | WEIGHT: 194.13 LBS | DIASTOLIC BLOOD PRESSURE: 53 MMHG | BODY MASS INDEX: 37.91 KG/M2 | HEART RATE: 92 BPM

## 2023-05-02 DIAGNOSIS — F31.9 BIPOLAR DISEASE DURING PREGNANCY IN SECOND TRIMESTER: ICD-10-CM

## 2023-05-02 DIAGNOSIS — O99.212 OBESITY AFFECTING PREGNANCY IN SECOND TRIMESTER: ICD-10-CM

## 2023-05-02 DIAGNOSIS — O99.342 BIPOLAR DISEASE DURING PREGNANCY IN SECOND TRIMESTER: ICD-10-CM

## 2023-05-02 DIAGNOSIS — O36.8390 FETAL ARRHYTHMIA AFFECTING PREGNANCY, ANTEPARTUM: ICD-10-CM

## 2023-05-02 DIAGNOSIS — O99.282 HYPOTHYROIDISM AFFECTING PREGNANCY IN SECOND TRIMESTER: ICD-10-CM

## 2023-05-02 DIAGNOSIS — H92.03 EAR PAIN, BILATERAL: ICD-10-CM

## 2023-05-02 DIAGNOSIS — G90.A POTS (POSTURAL ORTHOSTATIC TACHYCARDIA SYNDROME): ICD-10-CM

## 2023-05-02 DIAGNOSIS — E03.9 HYPOTHYROIDISM AFFECTING PREGNANCY IN SECOND TRIMESTER: ICD-10-CM

## 2023-05-02 PROCEDURE — 99214 PR OFFICE/OUTPT VISIT, EST, LEVL IV, 30-39 MIN: ICD-10-PCS | Mod: 25,S$GLB,, | Performed by: OBSTETRICS & GYNECOLOGY

## 2023-05-02 PROCEDURE — 3074F PR MOST RECENT SYSTOLIC BLOOD PRESSURE < 130 MM HG: ICD-10-PCS | Mod: CPTII,S$GLB,, | Performed by: OBSTETRICS & GYNECOLOGY

## 2023-05-02 PROCEDURE — 1160F PR REVIEW ALL MEDS BY PRESCRIBER/CLIN PHARMACIST DOCUMENTED: ICD-10-PCS | Mod: CPTII,S$GLB,, | Performed by: OBSTETRICS & GYNECOLOGY

## 2023-05-02 PROCEDURE — 76816 OB US FOLLOW-UP PER FETUS: CPT | Mod: S$GLB,,, | Performed by: OBSTETRICS & GYNECOLOGY

## 2023-05-02 PROCEDURE — 3008F BODY MASS INDEX DOCD: CPT | Mod: CPTII,S$GLB,, | Performed by: OBSTETRICS & GYNECOLOGY

## 2023-05-02 PROCEDURE — 76816 PR  US,PREGNANT UTERUS,F/U,TRANSABD APP: ICD-10-PCS | Mod: S$GLB,,, | Performed by: OBSTETRICS & GYNECOLOGY

## 2023-05-02 PROCEDURE — 3074F SYST BP LT 130 MM HG: CPT | Mod: CPTII,S$GLB,, | Performed by: OBSTETRICS & GYNECOLOGY

## 2023-05-02 PROCEDURE — 1159F MED LIST DOCD IN RCRD: CPT | Mod: CPTII,S$GLB,, | Performed by: OBSTETRICS & GYNECOLOGY

## 2023-05-02 PROCEDURE — 3008F PR BODY MASS INDEX (BMI) DOCUMENTED: ICD-10-PCS | Mod: CPTII,S$GLB,, | Performed by: OBSTETRICS & GYNECOLOGY

## 2023-05-02 PROCEDURE — 1160F RVW MEDS BY RX/DR IN RCRD: CPT | Mod: CPTII,S$GLB,, | Performed by: OBSTETRICS & GYNECOLOGY

## 2023-05-02 PROCEDURE — 3078F PR MOST RECENT DIASTOLIC BLOOD PRESSURE < 80 MM HG: ICD-10-PCS | Mod: CPTII,S$GLB,, | Performed by: OBSTETRICS & GYNECOLOGY

## 2023-05-02 PROCEDURE — 3078F DIAST BP <80 MM HG: CPT | Mod: CPTII,S$GLB,, | Performed by: OBSTETRICS & GYNECOLOGY

## 2023-05-02 PROCEDURE — 99214 OFFICE O/P EST MOD 30 MIN: CPT | Mod: 25,S$GLB,, | Performed by: OBSTETRICS & GYNECOLOGY

## 2023-05-02 PROCEDURE — 1159F PR MEDICATION LIST DOCUMENTED IN MEDICAL RECORD: ICD-10-PCS | Mod: CPTII,S$GLB,, | Performed by: OBSTETRICS & GYNECOLOGY

## 2023-05-02 RX ORDER — AZITHROMYCIN 250 MG/1
TABLET, FILM COATED ORAL
Qty: 6 TABLET | Refills: 0 | Status: SHIPPED | OUTPATIENT
Start: 2023-05-02 | End: 2023-05-07

## 2023-05-02 NOTE — PROGRESS NOTES
Maternal Fetal Medicine follow up consult    SUBJECTIVE:     Antonella Ackerman is a 22 y.o.  female with IUP at 22w3d who is seen in follow up consultation by M.  Pregnancy complications include:   Problem   Ear Pain, Bilateral   1. Hypothyroidism affecting pregnancy in second trimester   3. Bipolar disease during pregnancy in second trimester   2. BMI affecting pregnancy in second trimester   4. POTS (postural orthostatic tachycardia syndrome)     She is doing well overall.  She recently had an upper respiratory infection that was viral in nature likely and says that she is feeling some ear pain at this time as well as some hearing difficulties.  She states that she is prone to this and often gets ear pain (which she is told is fluid) after being sick. No LOF, VB, ctx. +FM.     PACs are noted today on fetal ultrasound.  She states that she has not recently had a large amount of caffeine.    Previous notes reviewed.   No changes to medical, surgical, family, social, or obstetric history.    Interval history since last [facility] visit:  She went to the ED on  for abdominal pain.  She reports being told she was dehydrated.  After being rehydrated her abdominal pain subsided and she was discharged to home.  She states she has gotten much better with hydration since this visit and has not had another episode.    Medications:  Current Outpatient Medications   Medication Instructions    azithromycin (Z-ROSARIO) 250 MG tablet Take 2 tablets by mouth on day 1; Take 1 tablet by mouth on days 2-5<BR>    busPIRone (BUSPAR) 15 mg, Oral, Nightly    levothyroxine (SYNTHROID) 75 mcg, Oral    melatonin 10 mg Chew Takes 15 mg qhs    risperiDONE (RISPERDAL) 3 mg, Oral, Nightly    risperiDONE (RISPERDAL) 1.5 mg, Oral, Every morning       Care team members:  Dr Syed - Primary OB     OBJECTIVE:   BP (!) 110/53 (BP Location: Right arm)   Pulse 92   Wt 88 kg (194 lb 1.8 oz)   BMI 37.91 kg/m²     Ultrasound performed. See  viewpoint for full ultrasound report.    A viable cerda pregnancy is visualized in cephalic presentation.  Estimated fetal weight is at the 52nd percentile with an abdominal circumference at the 64th percentile. Fetal anatomy is normal with suboptimal views remaining for aortic/ductal arch, right kidney and diaphragm. PACs are noted throughout the scan. Amniotic fluid volume is normal.  Placenta is anterior.      ASSESSMENT/PLAN:     22 y.o.  female with IUP at 22w3d    1. Hypothyroidism affecting pregnancy in second trimester  Thyroid requirements increase in pregnancy due to increases in metabolism and thyroid binding globulin. Thyroid function tests should be followed closely to ensure adequate treatment.  Hypothyroidism has been associated with higher pregnancy complication rates including miscarriage, preeclampsia, abruption, low birth weight and stillbirth. Untreated hypothyroidism has also been associated with adverse fetal neurological development, but well treated hypothyroidism (i.e., euthyroid state) carries an excellent prognosis for mother and baby.      23- TSH 2.4, FT4 1.59.   March & April- normal, per her report.    Recommendations:  Medication management:  Continue Synthroid at the current dose of 75 micrograms/day   Up to 1/3 of women may require increased hormone replacement in the first trimester, thus consider an empiric 25% increase in medication dose at pregnancy confirmation, while discharging postpartum patients on their pre-pregnancy dose.  Laboratory evaluation:   TSH should be monitored at least every trimester, or every 4 weeks after any medication adjustment. Adjust treatment as necessary to maintain TSH goal < 2.5 mIU/L.      2. BMI affecting pregnancy in second trimester  See prior documentation for specific counseling    BMI 37.9 @ 22w    Recommendations:  Discussed that TWG goal is 11-20 lbs  Screen for signs/symptoms of obstructive sleep apnea  Nutritionist consult  offered (this is to be ordered by primary OB provider)  Consider early 1 hr GCT (between 14-16 weeks gestation); repeat at 24-28 weeks gestation  Start low dose aspirin 81 mg daily at 12-16 weeks for preeclampsia risk reduction  Targeted anatomical survey scheduled at 18-20 weeks  Fetal growth ultrasound at 32 weeks if BMI >= 40  Lovenox 40 mg BID for VTE prophylaxis while admitted to the hospital (antepartum or postpartum) if BMI >= 40.   Encouraged breastfeeding  Postpartum lifestyle modifications & weight loss      3. Bipolar disease during pregnancy in second trimester  She reports a history of bipolar and is taking Risperdal 1.5mg QAM and 3mg QPM daily. She is also taking Buspar. She reports improvement of symptoms with the utilization of this medication regimen. Discussed increased risk for peripartum and postpartum mood disorders. Precautions provided.  She is established with a mental health provider and has follow up appointments scheduled Q3 months. She was on Depakote in the past and discontinued in October of 2022. LMP at the end of November 2022. She plans to restart this regimen after delivery under the direction of her mental health provider.    We have discussed the importance of optimization of mental health conditions during pregnancy in an effort to reduce the risk of postpartum mood disorders. We have discussed options for management including psychotherapy, counseling, cognitive behavioral therapy, exercise/yoga, journaling, and psychiatry services. She will notify our office if she is interested in being referred for any of the above.      4. POTS (postural orthostatic tachycardia syndrome)  She was previously diagnosed with POTS and has been followed by Dr Villatoro, cardiologist. She has routine appts annually and was last evaluated in October of 2022. She was taking Atenolol prior to pregnancy but discontinued in October.    She reports relatively stable symptoms since becoming pregnant and  being without medication. She states every now and then she has a flare up of high heart rate and low blood pressure. When she experiences these exacerbations, she increases sodium and water intake which helps a little. She denies severe symptoms or need for starting medication during pregnancy.    Ear pain, bilateral  She reports a recent onset of bilateral ear pain.  She states her ears feel very full as though she has fluid in her ears.  She reports having this issue prior to pregnancy with recurrent ear infections.  We have sent a prescription to her pharmacy.  If this issue does not resolve, recommend evaluation by ENT.    Fetal arrhythmia affecting pregnancy, antepartum  Today the finding of premature atrial contractions (PACs) were noted on ultrasound.  PACs, along with PVCs, are the most common of fetal arrhythmia, accounting for over 90% of fetal arrhythmias.  PACs are generally self-limited and benign with most resolving either antenatally or shortly after birth.  However, up to 5% of cases with PACs may progress to a more concerning fetal arrhythmia, such as supraventricular tachycardia (SVT).  Therefore, serial monitoring of the fetal heart rate initially is recommended.  This may be done by in-office auscultation or via ultrasound exam.  Once PACs resolve or are deemed to be infrequent and stable, the frequency of monitoring of the FHR may decrease or be discontinued. Generally no clear etiology or precipitating factors for PACs are identified; however, cessation of maternal intake of beta-sympathomimetic substances (caffeine, theophylline) or medications (OTC cold medicines, for example) is recommended.    F/u in 4 weeks for MFM visit/ultrasound    Yamila Dhillon MD  Maternal Fetal Medicine

## 2023-05-03 PROBLEM — O36.8390 FETAL ARRHYTHMIA AFFECTING PREGNANCY, ANTEPARTUM: Status: ACTIVE | Noted: 2023-05-03

## 2023-05-25 DIAGNOSIS — F31.9 BIPOLAR DISEASE DURING PREGNANCY IN SECOND TRIMESTER: ICD-10-CM

## 2023-05-25 DIAGNOSIS — O99.342 BIPOLAR DISEASE DURING PREGNANCY IN SECOND TRIMESTER: ICD-10-CM

## 2023-05-25 DIAGNOSIS — E03.9 HYPOTHYROIDISM AFFECTING PREGNANCY IN SECOND TRIMESTER: Primary | ICD-10-CM

## 2023-05-25 DIAGNOSIS — O99.282 HYPOTHYROIDISM AFFECTING PREGNANCY IN SECOND TRIMESTER: Primary | ICD-10-CM

## 2023-05-25 DIAGNOSIS — O99.212 OBESITY AFFECTING PREGNANCY IN SECOND TRIMESTER: ICD-10-CM

## 2023-05-30 ENCOUNTER — OFFICE VISIT (OUTPATIENT)
Dept: MATERNAL FETAL MEDICINE | Facility: CLINIC | Age: 23
End: 2023-05-30
Payer: COMMERCIAL

## 2023-05-30 ENCOUNTER — PROCEDURE VISIT (OUTPATIENT)
Dept: MATERNAL FETAL MEDICINE | Facility: CLINIC | Age: 23
End: 2023-05-30
Payer: COMMERCIAL

## 2023-05-30 VITALS
SYSTOLIC BLOOD PRESSURE: 113 MMHG | DIASTOLIC BLOOD PRESSURE: 54 MMHG | BODY MASS INDEX: 37.95 KG/M2 | HEART RATE: 92 BPM | WEIGHT: 194.31 LBS

## 2023-05-30 DIAGNOSIS — O99.282 HYPOTHYROIDISM AFFECTING PREGNANCY IN SECOND TRIMESTER: ICD-10-CM

## 2023-05-30 DIAGNOSIS — G90.A POTS (POSTURAL ORTHOSTATIC TACHYCARDIA SYNDROME): ICD-10-CM

## 2023-05-30 DIAGNOSIS — O99.212 OBESITY AFFECTING PREGNANCY IN SECOND TRIMESTER: ICD-10-CM

## 2023-05-30 DIAGNOSIS — O36.8390 FETAL ARRHYTHMIA AFFECTING PREGNANCY, ANTEPARTUM: ICD-10-CM

## 2023-05-30 DIAGNOSIS — F31.9 BIPOLAR DISEASE DURING PREGNANCY IN SECOND TRIMESTER: ICD-10-CM

## 2023-05-30 DIAGNOSIS — E03.9 HYPOTHYROIDISM AFFECTING PREGNANCY IN SECOND TRIMESTER: ICD-10-CM

## 2023-05-30 DIAGNOSIS — O99.342 BIPOLAR DISEASE DURING PREGNANCY IN SECOND TRIMESTER: ICD-10-CM

## 2023-05-30 PROCEDURE — 3008F PR BODY MASS INDEX (BMI) DOCUMENTED: ICD-10-PCS | Mod: CPTII,S$GLB,, | Performed by: OBSTETRICS & GYNECOLOGY

## 2023-05-30 PROCEDURE — 3074F SYST BP LT 130 MM HG: CPT | Mod: CPTII,S$GLB,, | Performed by: OBSTETRICS & GYNECOLOGY

## 2023-05-30 PROCEDURE — 76816 OB US FOLLOW-UP PER FETUS: CPT | Mod: S$GLB,,, | Performed by: OBSTETRICS & GYNECOLOGY

## 2023-05-30 PROCEDURE — 76816 PR  US,PREGNANT UTERUS,F/U,TRANSABD APP: ICD-10-PCS | Mod: S$GLB,,, | Performed by: OBSTETRICS & GYNECOLOGY

## 2023-05-30 PROCEDURE — 3008F BODY MASS INDEX DOCD: CPT | Mod: CPTII,S$GLB,, | Performed by: OBSTETRICS & GYNECOLOGY

## 2023-05-30 PROCEDURE — 99213 OFFICE O/P EST LOW 20 MIN: CPT | Mod: 25,S$GLB,, | Performed by: OBSTETRICS & GYNECOLOGY

## 2023-05-30 PROCEDURE — 99213 PR OFFICE/OUTPT VISIT, EST, LEVL III, 20-29 MIN: ICD-10-PCS | Mod: 25,S$GLB,, | Performed by: OBSTETRICS & GYNECOLOGY

## 2023-05-30 PROCEDURE — 1159F MED LIST DOCD IN RCRD: CPT | Mod: CPTII,S$GLB,, | Performed by: OBSTETRICS & GYNECOLOGY

## 2023-05-30 PROCEDURE — 1159F PR MEDICATION LIST DOCUMENTED IN MEDICAL RECORD: ICD-10-PCS | Mod: CPTII,S$GLB,, | Performed by: OBSTETRICS & GYNECOLOGY

## 2023-05-30 PROCEDURE — 1160F PR REVIEW ALL MEDS BY PRESCRIBER/CLIN PHARMACIST DOCUMENTED: ICD-10-PCS | Mod: CPTII,S$GLB,, | Performed by: OBSTETRICS & GYNECOLOGY

## 2023-05-30 PROCEDURE — 1160F RVW MEDS BY RX/DR IN RCRD: CPT | Mod: CPTII,S$GLB,, | Performed by: OBSTETRICS & GYNECOLOGY

## 2023-05-30 PROCEDURE — 3074F PR MOST RECENT SYSTOLIC BLOOD PRESSURE < 130 MM HG: ICD-10-PCS | Mod: CPTII,S$GLB,, | Performed by: OBSTETRICS & GYNECOLOGY

## 2023-05-30 PROCEDURE — 3078F DIAST BP <80 MM HG: CPT | Mod: CPTII,S$GLB,, | Performed by: OBSTETRICS & GYNECOLOGY

## 2023-05-30 PROCEDURE — 3078F PR MOST RECENT DIASTOLIC BLOOD PRESSURE < 80 MM HG: ICD-10-PCS | Mod: CPTII,S$GLB,, | Performed by: OBSTETRICS & GYNECOLOGY

## 2023-05-30 NOTE — ASSESSMENT & PLAN NOTE
Thyroid requirements increase in pregnancy due to increases in metabolism and thyroid binding globulin. Thyroid function tests should be followed closely to ensure adequate treatment.  Hypothyroidism has been associated with higher pregnancy complication rates including miscarriage, preeclampsia, abruption, low birth weight and stillbirth. Untreated hypothyroidism has also been associated with adverse fetal neurological development, but well treated hypothyroidism (i.e., euthyroid state) carries an excellent prognosis for mother and baby.      2/14/23- TSH 2.4, FT4 1.59.   March & April- normal, per her report.  She will have next labs in 2 weeks with Kayy ABBOTT.    Recommendations:  Medication management:   Continue Synthroid at the current dose of 75 micrograms/day    Up to 1/3 of women may require increased hormone replacement in the first trimester, thus consider an empiric 25% increase in medication dose at pregnancy confirmation, while discharging postpartum patients on their pre-pregnancy dose.  Laboratory evaluation:   TSH should be monitored at least every trimester, or every 4 weeks after any medication adjustment. Adjust treatment as necessary to maintain TSH goal < 2.5 mIU/L.

## 2023-05-30 NOTE — ASSESSMENT & PLAN NOTE
5/2/23- The finding of premature atrial contractions (PACs) were noted on ultrasound.  PACs, along with PVCs, are the most common of fetal arrhythmia, accounting for over 90% of fetal arrhythmias.  PACs are generally self-limited and benign with most resolving either antenatally or shortly after birth.  However, up to 5% of cases with PACs may progress to a more concerning fetal arrhythmia, such as supraventricular tachycardia (SVT).  Therefore, serial monitoring of the fetal heart rate initially is recommended.  This may be done by in-office auscultation or via ultrasound exam.  Once PACs resolve or are deemed to be infrequent and stable, the frequency of monitoring of the FHR may decrease or be discontinued. Generally no clear etiology or precipitating factors for PACs are identified; however, cessation of maternal intake of beta-sympathomimetic substances (caffeine, theophylline) or medications (OTC cold medicines, for example) is recommended.    5/30/23- Several assessments of fetal heart rate and rhythm were taken. No evidence of fetal PACs.

## 2023-05-30 NOTE — PROGRESS NOTES
Maternal Fetal Medicine follow up consult    SUBJECTIVE:     Antonella Ackerman is a 22 y.o.  female with IUP at 26w3d who is seen in follow up consultation by MFM.  Pregnancy complications include:   Problem   5. Fetal arrhythmia affecting pregnancy, antepartum   1. Hypothyroidism affecting pregnancy in second trimester   3. Bipolar disease during pregnancy in second trimester   2. BMI affecting pregnancy in second trimester   4. POTS (postural orthostatic tachycardia syndrome)     At her last office visit here, she had complaints of bilateral ear pain.  She reports, today, that the ear pain is better, but she does have some itching in her ear canal. We have recommended over the counter ear drops. If worsens, seek medical care.   In regards to her POTS, she reports more symptoms associated with dehydration. She denies any needs from us.     Denies LOF, VB, contractions. Reports positive fetal movement.    Previous notes reviewed.   No changes to medical, surgical, family, social, or obstetric history.    Interval history since last M visit: no changes    Medications:  Current Outpatient Medications   Medication Instructions    busPIRone (BUSPAR) 15 mg, Oral, Nightly    levothyroxine (SYNTHROID) 75 mcg, Oral    melatonin 10 mg Chew Takes 15 mg qhs    risperiDONE (RISPERDAL) 3 mg, Oral, Nightly    risperiDONE (RISPERDAL) 1.5 mg, Oral, Every morning       Care team members:  Dr Syed - Primary OB       OBJECTIVE:   BP (!) 113/54 (BP Location: Right arm)   Pulse 92   Wt 88.2 kg (194 lb 5.4 oz)   BMI 37.95 kg/m²     Ultrasound performed. See viewpoint for full ultrasound report.    A viable cerda pregnancy is visualized in breech presentation.  Estimated fetal weight is at the 54th percentile with an abdominal circumference at the 72nd percentile.    No fetal abnormalities are noted and previous anatomic survey was normal. No PACs seen. Amniotic fluid volume is normal.  Placenta is anterior.       ASSESSMENT/PLAN:     22 y.o.  female with IUP at 26w3d    1. Hypothyroidism affecting pregnancy in second trimester  Thyroid requirements increase in pregnancy due to increases in metabolism and thyroid binding globulin. Thyroid function tests should be followed closely to ensure adequate treatment.  Hypothyroidism has been associated with higher pregnancy complication rates including miscarriage, preeclampsia, abruption, low birth weight and stillbirth. Untreated hypothyroidism has also been associated with adverse fetal neurological development, but well treated hypothyroidism (i.e., euthyroid state) carries an excellent prognosis for mother and baby.      23- TSH 2.4, FT4 1.59.   March & April- normal, per her report.    Recommendations:  Medication management:  Continue Synthroid at the current dose of 75 micrograms/day   Up to 1/3 of women may require increased hormone replacement in the first trimester, thus consider an empiric 25% increase in medication dose at pregnancy confirmation, while discharging postpartum patients on their pre-pregnancy dose.  Laboratory evaluation:   TSH should be monitored at least every trimester, or every 4 weeks after any medication adjustment. Adjust treatment as necessary to maintain TSH goal < 2.5 mIU/L.      2. BMI affecting pregnancy in second trimester  See prior documentation for specific counseling    BMI 37.9 @ 26w    Recommendations:  Discussed that TWG goal is 11-20 lbs  Screen for signs/symptoms of obstructive sleep apnea  Nutritionist consult offered (this is to be ordered by primary OB provider)  Consider early 1 hr GCT (between 14-16 weeks gestation); repeat at 24-28 weeks gestation  Start low dose aspirin 81 mg daily at 12-16 weeks for preeclampsia risk reduction  Targeted anatomical survey scheduled at 18-20 weeks  Fetal growth ultrasound at 32 weeks if BMI >= 40  Lovenox 40 mg BID for VTE prophylaxis while admitted to the hospital (antepartum or  postpartum) if BMI >= 40.   Encouraged breastfeeding  Postpartum lifestyle modifications & weight loss      3. Bipolar disease during pregnancy in second trimester  She reports a history of bipolar and is taking Risperdal 1.5mg QAM and 3mg QPM daily. She is also taking Buspar. She reports improvement of symptoms with the utilization of this medication regimen. Discussed increased risk for peripartum and postpartum mood disorders. Precautions provided.  She is established with a mental health provider and has follow up appointments scheduled Q3 months. She was on Depakote in the past and discontinued in October of 2022. LMP at the end of November 2022. She plans to restart this regimen after delivery under the direction of her mental health provider.    We have discussed the importance of optimization of mental health conditions during pregnancy in an effort to reduce the risk of postpartum mood disorders. We have discussed options for management including psychotherapy, counseling, cognitive behavioral therapy, exercise/yoga, journaling, and psychiatry services. She will notify our office if she is interested in being referred for any of the above.      4. POTS (postural orthostatic tachycardia syndrome)  She was previously diagnosed with POTS and has been followed by Dr Villatoro, cardiologist. She has routine appts annually and was last evaluated in October of 2022. She was taking Atenolol prior to pregnancy but discontinued in October.    She reports relatively stable symptoms since becoming pregnant and being without medication. She states every now and then she has a flare up of high heart rate and low blood pressure. When she experiences these exacerbations, she increases sodium and water intake which helps a little. She denies severe symptoms or need for starting medication during pregnancy.    5. Fetal arrhythmia affecting pregnancy, antepartum  5/2/23- The finding of premature atrial contractions (PACs)  were noted on ultrasound.  PACs, along with PVCs, are the most common of fetal arrhythmia, accounting for over 90% of fetal arrhythmias.  PACs are generally self-limited and benign with most resolving either antenatally or shortly after birth.  However, up to 5% of cases with PACs may progress to a more concerning fetal arrhythmia, such as supraventricular tachycardia (SVT).  Therefore, serial monitoring of the fetal heart rate initially is recommended.  This may be done by in-office auscultation or via ultrasound exam.  Once PACs resolve or are deemed to be infrequent and stable, the frequency of monitoring of the FHR may decrease or be discontinued. Generally no clear etiology or precipitating factors for PACs are identified; however, cessation of maternal intake of beta-sympathomimetic substances (caffeine, theophylline) or medications (OTC cold medicines, for example) is recommended.    5/30/23- Several assessments of fetal heart rate and rhythm were taken. No evidence of fetal PACs.     F/u in 6 weeks for [facility] visit/ultrasound    Yamila Dhillon MD  Maternal Fetal Medicine

## 2023-05-30 NOTE — ASSESSMENT & PLAN NOTE
See prior documentation for specific counseling    BMI 37.9 @ 26w    Recommendations:   Discussed that TWG goal is 11-20 lbs   Screen for signs/symptoms of obstructive sleep apnea   Nutritionist consult offered (this is to be ordered by primary OB provider)   Consider early 1 hr GCT (between 14-16 weeks gestation); repeat at 24-28 weeks gestation   Start low dose aspirin 81 mg daily at 12-16 weeks for preeclampsia risk reduction   Targeted anatomical survey scheduled at 18-20 weeks   Fetal growth ultrasound at 32 weeks if BMI >= 40   Lovenox 40 mg BID for VTE prophylaxis while admitted to the hospital (antepartum or postpartum) if BMI >= 40.    Encouraged breastfeeding   Postpartum lifestyle modifications & weight loss

## 2023-06-30 ENCOUNTER — HOSPITAL ENCOUNTER (EMERGENCY)
Facility: HOSPITAL | Age: 23
Discharge: HOME | End: 2023-06-30
Attending: OBSTETRICS & GYNECOLOGY
Payer: COMMERCIAL

## 2023-06-30 VITALS
SYSTOLIC BLOOD PRESSURE: 122 MMHG | HEART RATE: 95 BPM | DIASTOLIC BLOOD PRESSURE: 59 MMHG | RESPIRATION RATE: 18 BRPM | TEMPERATURE: 100 F | OXYGEN SATURATION: 97 %

## 2023-06-30 DIAGNOSIS — N30.90 CYSTITIS: ICD-10-CM

## 2023-06-30 DIAGNOSIS — R10.84 GENERALIZED ABDOMINAL PAIN: Primary | ICD-10-CM

## 2023-06-30 LAB
APPEARANCE UR: ABNORMAL
BACTERIA #/AREA URNS AUTO: ABNORMAL /HPF
BILIRUB UR QL STRIP.AUTO: NEGATIVE MG/DL
CAOX CRY URNS QL MICRO: ABNORMAL /HPF
COLOR UR: YELLOW
GLUCOSE UR QL STRIP.AUTO: NEGATIVE MG/DL
KETONES UR QL STRIP.AUTO: NEGATIVE MG/DL
LEUKOCYTE ESTERASE UR QL STRIP.AUTO: ABNORMAL UNIT/L
NITRITE UR QL STRIP.AUTO: NEGATIVE
PH UR STRIP.AUTO: 6.5 [PH]
PROT UR QL STRIP.AUTO: NEGATIVE MG/DL
RBC #/AREA URNS AUTO: ABNORMAL /HPF
RBC UR QL AUTO: NEGATIVE UNIT/L
SP GR UR STRIP.AUTO: 1.01 (ref 1–1.03)
SQUAMOUS #/AREA URNS AUTO: 16 /HPF
UROBILINOGEN UR STRIP-ACNC: 1 MG/DL
WBC #/AREA URNS AUTO: 12 /HPF

## 2023-06-30 PROCEDURE — 25000003 PHARM REV CODE 250: Performed by: OBSTETRICS & GYNECOLOGY

## 2023-06-30 PROCEDURE — 81001 URINALYSIS AUTO W/SCOPE: CPT | Performed by: OBSTETRICS & GYNECOLOGY

## 2023-06-30 PROCEDURE — 99284 EMERGENCY DEPT VISIT MOD MDM: CPT

## 2023-06-30 PROCEDURE — 87088 URINE BACTERIA CULTURE: CPT | Performed by: OBSTETRICS & GYNECOLOGY

## 2023-06-30 PROCEDURE — 63600175 PHARM REV CODE 636 W HCPCS: Performed by: OBSTETRICS & GYNECOLOGY

## 2023-06-30 RX ORDER — ACETAMINOPHEN 500 MG
1000 TABLET ORAL
Status: COMPLETED | OUTPATIENT
Start: 2023-06-30 | End: 2023-06-30

## 2023-06-30 RX ORDER — NITROFURANTOIN 25; 75 MG/1; MG/1
100 CAPSULE ORAL 2 TIMES DAILY
Qty: 5 CAPSULE | Refills: 0 | Status: SHIPPED | OUTPATIENT
Start: 2023-06-30 | End: 2023-08-08

## 2023-06-30 RX ADMIN — SODIUM CHLORIDE, POTASSIUM CHLORIDE, SODIUM LACTATE AND CALCIUM CHLORIDE 1000 ML: 600; 310; 30; 20 INJECTION, SOLUTION INTRAVENOUS at 10:06

## 2023-06-30 RX ADMIN — ACETAMINOPHEN 1000 MG: 500 TABLET, FILM COATED ORAL at 10:06

## 2023-07-01 NOTE — ED PROVIDER NOTES
Encounter Date: 2023       History     Chief Complaint   Patient presents with    Abdominal Pain     IUP @ 30.6 weeks c/o ctx since  every 5 minutes.     HPI  Review of patient's allergies indicates:   Allergen Reactions    Adhesive tape-silicones Blisters    Penicillins Hives     Other reaction(s): Unknown    Adhesive     Apple juice      Past Medical History:   Diagnosis Date    Bipolar disorder, unspecified     Hypothyroidism, unspecified     Narcolepsy     POTS (postural orthostatic tachycardia syndrome)      Past Surgical History:   Procedure Laterality Date    CHOLECYSTECTOMY      age 17    TONSILLECTOMY, ADENOIDECTOMY      TYMPANOSTOMY TUBE PLACEMENT       Family History   Problem Relation Age of Onset    Hypothyroidism Mother     Hyperlipidemia Mother     Hyperlipidemia Father     Hypertension Father     Brain cancer Brother         Tumor     Social History     Tobacco Use    Smoking status: Never    Smokeless tobacco: Never   Substance Use Topics    Alcohol use: Not Currently    Drug use: Never     Review of Systems    Physical Exam     Initial Vitals [231]   BP Pulse Resp Temp SpO2   (!) 122/59 95 18 99.7 °F (37.6 °C) 97 %      MAP       --         Physical Exam    ED Course   Procedures  Labs Reviewed   URINALYSIS, REFLEX TO URINE CULTURE          Imaging Results    None          Medications   lactated ringers bolus 1,000 mL (has no administration in time range)   acetaminophen tablet 1,000 mg (has no administration in time range)                              Clinical Impression:    This  @ 30 1/7 weeks gestation complains of pain of the lower abdomen. Denies pain, bleeding, or movement precautions. No contractions noted on the monitor.    Tracing: reactive  VE: 1/LONG/Posterior    A/P: Cystitis vs dehydration, vs ptl  -UA  -IV Fluids  -possible antibiotics then discharge home              Max Ruby MD  23 [id number]

## 2023-07-03 LAB — BACTERIA UR CULT: NORMAL

## 2023-07-10 DIAGNOSIS — O99.343 BIPOLAR DISEASE DURING PREGNANCY IN THIRD TRIMESTER: ICD-10-CM

## 2023-07-10 DIAGNOSIS — G90.A POTS (POSTURAL ORTHOSTATIC TACHYCARDIA SYNDROME): ICD-10-CM

## 2023-07-10 DIAGNOSIS — O99.213 OBESITY AFFECTING PREGNANCY IN THIRD TRIMESTER: ICD-10-CM

## 2023-07-10 DIAGNOSIS — O99.212 OBESITY AFFECTING PREGNANCY IN SECOND TRIMESTER: ICD-10-CM

## 2023-07-10 DIAGNOSIS — E03.9 HYPOTHYROIDISM AFFECTING PREGNANCY IN THIRD TRIMESTER: ICD-10-CM

## 2023-07-10 DIAGNOSIS — O99.283 HYPOTHYROIDISM AFFECTING PREGNANCY IN THIRD TRIMESTER: ICD-10-CM

## 2023-07-10 DIAGNOSIS — F31.9 BIPOLAR DISEASE DURING PREGNANCY IN THIRD TRIMESTER: ICD-10-CM

## 2023-07-10 DIAGNOSIS — O99.282 HYPOTHYROIDISM AFFECTING PREGNANCY IN SECOND TRIMESTER: Primary | ICD-10-CM

## 2023-07-10 DIAGNOSIS — E03.9 HYPOTHYROIDISM AFFECTING PREGNANCY IN SECOND TRIMESTER: Primary | ICD-10-CM

## 2023-07-11 ENCOUNTER — PROCEDURE VISIT (OUTPATIENT)
Dept: MATERNAL FETAL MEDICINE | Facility: CLINIC | Age: 23
End: 2023-07-11
Payer: COMMERCIAL

## 2023-07-11 ENCOUNTER — OFFICE VISIT (OUTPATIENT)
Dept: MATERNAL FETAL MEDICINE | Facility: CLINIC | Age: 23
End: 2023-07-11
Payer: COMMERCIAL

## 2023-07-11 VITALS
BODY MASS INDEX: 39.53 KG/M2 | WEIGHT: 202.38 LBS | HEART RATE: 107 BPM | SYSTOLIC BLOOD PRESSURE: 129 MMHG | DIASTOLIC BLOOD PRESSURE: 68 MMHG

## 2023-07-11 DIAGNOSIS — E03.9 HYPOTHYROIDISM AFFECTING PREGNANCY IN THIRD TRIMESTER: ICD-10-CM

## 2023-07-11 DIAGNOSIS — F31.9 BIPOLAR DISEASE DURING PREGNANCY IN THIRD TRIMESTER: ICD-10-CM

## 2023-07-11 DIAGNOSIS — E03.9 HYPOTHYROIDISM AFFECTING PREGNANCY IN SECOND TRIMESTER: ICD-10-CM

## 2023-07-11 DIAGNOSIS — O99.342 BIPOLAR DISEASE DURING PREGNANCY IN SECOND TRIMESTER: ICD-10-CM

## 2023-07-11 DIAGNOSIS — O99.343 BIPOLAR DISEASE DURING PREGNANCY IN THIRD TRIMESTER: ICD-10-CM

## 2023-07-11 DIAGNOSIS — O99.212 OBESITY AFFECTING PREGNANCY IN SECOND TRIMESTER: ICD-10-CM

## 2023-07-11 DIAGNOSIS — F31.9 BIPOLAR DISEASE DURING PREGNANCY IN SECOND TRIMESTER: ICD-10-CM

## 2023-07-11 DIAGNOSIS — O99.283 HYPOTHYROIDISM AFFECTING PREGNANCY IN THIRD TRIMESTER: ICD-10-CM

## 2023-07-11 DIAGNOSIS — O99.282 HYPOTHYROIDISM AFFECTING PREGNANCY IN SECOND TRIMESTER: ICD-10-CM

## 2023-07-11 DIAGNOSIS — G90.A POTS (POSTURAL ORTHOSTATIC TACHYCARDIA SYNDROME): ICD-10-CM

## 2023-07-11 DIAGNOSIS — O99.213 OBESITY AFFECTING PREGNANCY IN THIRD TRIMESTER: ICD-10-CM

## 2023-07-11 PROCEDURE — 1159F PR MEDICATION LIST DOCUMENTED IN MEDICAL RECORD: ICD-10-PCS | Mod: CPTII,S$GLB,, | Performed by: OBSTETRICS & GYNECOLOGY

## 2023-07-11 PROCEDURE — 1160F RVW MEDS BY RX/DR IN RCRD: CPT | Mod: CPTII,S$GLB,, | Performed by: OBSTETRICS & GYNECOLOGY

## 2023-07-11 PROCEDURE — 76819 FETAL BIOPHYS PROFIL W/O NST: CPT | Mod: S$GLB,,, | Performed by: OBSTETRICS & GYNECOLOGY

## 2023-07-11 PROCEDURE — 1159F MED LIST DOCD IN RCRD: CPT | Mod: CPTII,S$GLB,, | Performed by: OBSTETRICS & GYNECOLOGY

## 2023-07-11 PROCEDURE — 3008F PR BODY MASS INDEX (BMI) DOCUMENTED: ICD-10-PCS | Mod: CPTII,S$GLB,, | Performed by: OBSTETRICS & GYNECOLOGY

## 2023-07-11 PROCEDURE — 3008F BODY MASS INDEX DOCD: CPT | Mod: CPTII,S$GLB,, | Performed by: OBSTETRICS & GYNECOLOGY

## 2023-07-11 PROCEDURE — 76816 OB US FOLLOW-UP PER FETUS: CPT | Mod: S$GLB,,, | Performed by: OBSTETRICS & GYNECOLOGY

## 2023-07-11 PROCEDURE — 76819 PR US, OB, FETAL BIOPHYSICAL, W/O NST: ICD-10-PCS | Mod: S$GLB,,, | Performed by: OBSTETRICS & GYNECOLOGY

## 2023-07-11 PROCEDURE — 3078F PR MOST RECENT DIASTOLIC BLOOD PRESSURE < 80 MM HG: ICD-10-PCS | Mod: CPTII,S$GLB,, | Performed by: OBSTETRICS & GYNECOLOGY

## 2023-07-11 PROCEDURE — 1160F PR REVIEW ALL MEDS BY PRESCRIBER/CLIN PHARMACIST DOCUMENTED: ICD-10-PCS | Mod: CPTII,S$GLB,, | Performed by: OBSTETRICS & GYNECOLOGY

## 2023-07-11 PROCEDURE — 3074F SYST BP LT 130 MM HG: CPT | Mod: CPTII,S$GLB,, | Performed by: OBSTETRICS & GYNECOLOGY

## 2023-07-11 PROCEDURE — 3078F DIAST BP <80 MM HG: CPT | Mod: CPTII,S$GLB,, | Performed by: OBSTETRICS & GYNECOLOGY

## 2023-07-11 PROCEDURE — 99213 PR OFFICE/OUTPT VISIT, EST, LEVL III, 20-29 MIN: ICD-10-PCS | Mod: 25,S$GLB,, | Performed by: OBSTETRICS & GYNECOLOGY

## 2023-07-11 PROCEDURE — 76816 PR  US,PREGNANT UTERUS,F/U,TRANSABD APP: ICD-10-PCS | Mod: S$GLB,,, | Performed by: OBSTETRICS & GYNECOLOGY

## 2023-07-11 PROCEDURE — 99213 OFFICE O/P EST LOW 20 MIN: CPT | Mod: 25,S$GLB,, | Performed by: OBSTETRICS & GYNECOLOGY

## 2023-07-11 PROCEDURE — 3074F PR MOST RECENT SYSTOLIC BLOOD PRESSURE < 130 MM HG: ICD-10-PCS | Mod: CPTII,S$GLB,, | Performed by: OBSTETRICS & GYNECOLOGY

## 2023-07-11 NOTE — PROGRESS NOTES
Maternal Fetal Medicine follow up consult    SUBJECTIVE:     Antonella Ackerman is a 23 y.o.  female with IUP at 32w3d who is seen in follow up consultation by MFM.  Pregnancy complications include:   Problem   1. Hypothyroidism affecting pregnancy in second trimester   3. Bipolar disease during pregnancy in second trimester   2. BMI affecting pregnancy in second trimester   4. POTS (postural orthostatic tachycardia syndrome)       She is doing well overall.  She states that she has some tachycardia and tries very hard to hydrate well and get enough salt intake due to her POTS disease.  She has no other complaints.  She has not had any syncope or lightheadedness.  She notices a few Guero Friedman.  She plans to get back on her atenolol postpartum.  We discussed the possibility of metoprolol as needed for symptom control and she declines politely at this time.    Denies LOF, VB, contractions. Reports positive fetal movement.    Previous notes reviewed.   No changes to medical, surgical, family, social, or obstetric history.    Interval history since last MFM visit: no changes    Medications:  Current Outpatient Medications   Medication Instructions    busPIRone (BUSPAR) 15 mg, Oral, Nightly    levothyroxine (SYNTHROID) 75 mcg, Oral    melatonin 10 mg Chew Takes 15 mg qhs    nitrofurantoin, macrocrystal-monohydrate, (MACROBID) 100 MG capsule 100 mg, Oral, 2 times daily    prenatal vit no.124/iron/folic (PRENATAL VITAMIN ORAL) Oral    risperiDONE (RISPERDAL) 3 mg, Oral, Nightly    risperiDONE (RISPERDAL) 1.5 mg, Oral, Every morning       Care team members:  Dr Syed - Primary OB       OBJECTIVE:   /68 (BP Location: Right arm)   Pulse 107   Wt 91.8 kg (202 lb 6.1 oz)   BMI 39.53 kg/m²     Ultrasound performed. See viewpoint for full ultrasound report.    A viable cerda pregnancy is visualized in cephalic presentation.  Estimated fetal weight is at the 29th percentile with an abdominal circumference  at the 56th percentile.    No fetal abnormalities are noted and previous anatomic survey was normal. Amniotic fluid volume is normal.  Placenta is anterior. Biophysical profile Is 8/8.     ASSESSMENT/PLAN:     23 y.o.  female with IUP at 32w3d    1. Hypothyroidism affecting pregnancy in second trimester  Thyroid requirements increase in pregnancy due to increases in metabolism and thyroid binding globulin. Thyroid function tests should be followed closely to ensure adequate treatment.  Hypothyroidism has been associated with higher pregnancy complication rates including miscarriage, preeclampsia, abruption, low birth weight and stillbirth. Untreated hypothyroidism has also been associated with adverse fetal neurological development, but well treated hypothyroidism (i.e., euthyroid state) carries an excellent prognosis for mother and baby.      23- TSH 2.4, FT4 1.59.   Labs have been normal per her report.    Recommendations:  Medication management:  Continue Synthroid at the current dose of 75 micrograms/day   Up to 1/3 of women may require increased hormone replacement in the first trimester, thus consider an empiric 25% increase in medication dose at pregnancy confirmation, while discharging postpartum patients on their pre-pregnancy dose.  Laboratory evaluation:   TSH should be monitored at least every trimester, or every 4 weeks after any medication adjustment. Adjust treatment as necessary to maintain TSH goal < 2.5 mIU/L.      4. POTS (postural orthostatic tachycardia syndrome)  She was previously diagnosed with POTS and has been followed by Dr Villatoro, cardiologist. She has routine appts annually and was last evaluated in 2022. She was taking Atenolol prior to pregnancy but discontinued in October.    She reports relatively stable symptoms since becoming pregnant and being without medication. She states every now and then she has a flare up of high heart rate and low blood pressure. When  she experiences these exacerbations, she increases sodium and water intake which helps a little. She denies severe symptoms or need for starting medication during pregnancy.    2. BMI affecting pregnancy in second trimester  See prior documentation for specific counseling    BMI 39    Recommendations:  Discussed that TWG goal is 11-20 lbs  Screen for signs/symptoms of obstructive sleep apnea  Nutritionist consult offered (this is to be ordered by primary OB provider)  Consider early 1 hr GCT (between 14-16 weeks gestation); repeat at 24-28 weeks gestation  Start low dose aspirin 81 mg daily at 12-16 weeks for preeclampsia risk reduction  Targeted anatomical survey scheduled at 18-20 weeks  Fetal growth ultrasound at 32 weeks if BMI >= 40  Lovenox 40 mg BID for VTE prophylaxis while admitted to the hospital (antepartum or postpartum) if BMI >= 40.   Encouraged breastfeeding  Postpartum lifestyle modifications & weight loss      3. Bipolar disease during pregnancy in second trimester  She reports a history of bipolar and is taking Risperdal 1.5mg QAM and 3mg QPM daily. She is also taking Buspar. She reports improvement of symptoms with the utilization of this medication regimen. Discussed increased risk for peripartum and postpartum mood disorders. Precautions provided.  She is established with a mental health provider and has follow up appointments scheduled Q3 months. She was on Depakote in the past and discontinued in October of 2022. LMP at the end of November 2022. She plans to restart this regimen after delivery under the direction of her mental health provider.    We have discussed the importance of optimization of mental health conditions during pregnancy in an effort to reduce the risk of postpartum mood disorders. We have discussed options for management including psychotherapy, counseling, cognitive behavioral therapy, exercise/yoga, journaling, and psychiatry services. She will notify our office if she is  interested in being referred for any of the above.    F/u in 4 weeks for MFM visit/ultrasound    Yamila Dhillon MD  Maternal Fetal Medicine

## 2023-07-11 NOTE — ASSESSMENT & PLAN NOTE
Thyroid requirements increase in pregnancy due to increases in metabolism and thyroid binding globulin. Thyroid function tests should be followed closely to ensure adequate treatment.  Hypothyroidism has been associated with higher pregnancy complication rates including miscarriage, preeclampsia, abruption, low birth weight and stillbirth. Untreated hypothyroidism has also been associated with adverse fetal neurological development, but well treated hypothyroidism (i.e., euthyroid state) carries an excellent prognosis for mother and baby.      2/14/23- TSH 2.4, FT4 1.59.   Labs have been normal per her report.    Recommendations:  Medication management:   Continue Synthroid at the current dose of 75 micrograms/day    Up to 1/3 of women may require increased hormone replacement in the first trimester, thus consider an empiric 25% increase in medication dose at pregnancy confirmation, while discharging postpartum patients on their pre-pregnancy dose.  Laboratory evaluation:   TSH should be monitored at least every trimester, or every 4 weeks after any medication adjustment. Adjust treatment as necessary to maintain TSH goal < 2.5 mIU/L.

## 2023-07-11 NOTE — ASSESSMENT & PLAN NOTE
See prior documentation for specific counseling    BMI 39    Recommendations:   Discussed that TWG goal is 11-20 lbs   Screen for signs/symptoms of obstructive sleep apnea   Nutritionist consult offered (this is to be ordered by primary OB provider)   Consider early 1 hr GCT (between 14-16 weeks gestation); repeat at 24-28 weeks gestation   Start low dose aspirin 81 mg daily at 12-16 weeks for preeclampsia risk reduction   Targeted anatomical survey scheduled at 18-20 weeks   Fetal growth ultrasound at 32 weeks if BMI >= 40   Lovenox 40 mg BID for VTE prophylaxis while admitted to the hospital (antepartum or postpartum) if BMI >= 40.    Encouraged breastfeeding   Postpartum lifestyle modifications & weight loss

## 2023-07-16 ENCOUNTER — HOSPITAL ENCOUNTER (EMERGENCY)
Facility: HOSPITAL | Age: 23
Discharge: HOME | End: 2023-07-16
Attending: OBSTETRICS & GYNECOLOGY
Payer: COMMERCIAL

## 2023-07-16 VITALS
RESPIRATION RATE: 16 BRPM | OXYGEN SATURATION: 98 % | HEART RATE: 82 BPM | DIASTOLIC BLOOD PRESSURE: 64 MMHG | TEMPERATURE: 98 F | SYSTOLIC BLOOD PRESSURE: 116 MMHG

## 2023-07-16 DIAGNOSIS — R10.84 GENERALIZED ABDOMINAL PAIN: Primary | ICD-10-CM

## 2023-07-16 PROCEDURE — 25000003 PHARM REV CODE 250: Performed by: OBSTETRICS & GYNECOLOGY

## 2023-07-16 PROCEDURE — 99284 EMERGENCY DEPT VISIT MOD MDM: CPT

## 2023-07-16 PROCEDURE — 96372 THER/PROPH/DIAG INJ SC/IM: CPT | Performed by: OBSTETRICS & GYNECOLOGY

## 2023-07-16 PROCEDURE — 63600175 PHARM REV CODE 636 W HCPCS: Performed by: OBSTETRICS & GYNECOLOGY

## 2023-07-16 RX ORDER — ACETAMINOPHEN 500 MG
1000 TABLET ORAL ONCE
Status: COMPLETED | OUTPATIENT
Start: 2023-07-16 | End: 2023-07-16

## 2023-07-16 RX ORDER — ACETAMINOPHEN 500 MG
1000 TABLET ORAL ONCE
Status: DISCONTINUED | OUTPATIENT
Start: 2023-07-16 | End: 2023-07-16

## 2023-07-16 RX ORDER — TERBUTALINE SULFATE 1 MG/ML
0.25 INJECTION SUBCUTANEOUS ONCE
Status: COMPLETED | OUTPATIENT
Start: 2023-07-16 | End: 2023-07-16

## 2023-07-16 RX ORDER — TERBUTALINE SULFATE 1 MG/ML
INJECTION SUBCUTANEOUS
Status: DISCONTINUED
Start: 2023-07-16 | End: 2023-07-16 | Stop reason: HOSPADM

## 2023-07-16 RX ADMIN — ACETAMINOPHEN 1000 MG: 500 TABLET, FILM COATED ORAL at 03:07

## 2023-07-16 RX ADMIN — SODIUM CHLORIDE, POTASSIUM CHLORIDE, SODIUM LACTATE AND CALCIUM CHLORIDE 1000 ML: 600; 310; 30; 20 INJECTION, SOLUTION INTRAVENOUS at 03:07

## 2023-07-16 RX ADMIN — TERBUTALINE SULFATE 0.25 MG: 1 INJECTION SUBCUTANEOUS at 03:07

## 2023-07-16 NOTE — ED PROVIDER NOTES
Encounter Date: 2023       History     Chief Complaint   Patient presents with    Abdominal Pain     HPI  Review of patient's allergies indicates:   Allergen Reactions    Adhesive tape-silicones Blisters    Penicillins Hives     Other reaction(s): Unknown    Adhesive     Apple juice      Past Medical History:   Diagnosis Date    Bipolar disorder, unspecified     Hypothyroidism, unspecified     Narcolepsy     POTS (postural orthostatic tachycardia syndrome)      Past Surgical History:   Procedure Laterality Date    CHOLECYSTECTOMY      age 17    TONSILLECTOMY, ADENOIDECTOMY      TYMPANOSTOMY TUBE PLACEMENT       Family History   Problem Relation Age of Onset    Hypothyroidism Mother     Hyperlipidemia Mother     Hyperlipidemia Father     Hypertension Father     Brain cancer Brother         Tumor     Social History     Tobacco Use    Smoking status: Never    Smokeless tobacco: Never   Substance Use Topics    Alcohol use: Not Currently    Drug use: Never     Review of Systems    Physical Exam     Initial Vitals   BP Pulse Resp Temp SpO2   23 0234 23 0234 23 0234 23 0234 23 0235   116/64 84 16 98.3 °F (36.8 °C) 98 %      MAP       --                Physical Exam    ED Course   Procedures  Labs Reviewed - No data to display       Imaging Results    None          Medications   terbutaline (BRETHINE) 1 mg/mL injection (has no administration in time range)   lactated ringers bolus 1,000 mL (1,000 mLs Intravenous New Bag 23 0316)   terbutaline injection 0.25 mg (0.25 mg Subcutaneous Given 23 0317)   acetaminophen tablet 1,000 mg (1,000 mg Oral Given 23 0304)                              Clinical Impression:    This  @ 33 1/7 weeks gestation presents with complaints of pain and contractions. Denies LOF or UB  No complications this pregnancy    Tracing: reactive  VE: 1/long/posterior    A/P: Labor ruled out  -pain, bleeding, movement precautions  -follow up at next prenatal  visit     ED Disposition Condition    Discharge           ED Prescriptions    None       Follow-up Information       Follow up With Specialties Details Why Contact Info    Ale Syed MD Obstetrics and Gynecology Follow up As needed, If symptoms worsen or return to hospital [address] [place] [address] Drive  [address]  [facility] [id number] [phone]               Max Ruby MD  07/16/23 6772

## 2023-07-20 ENCOUNTER — HOSPITAL ENCOUNTER (EMERGENCY)
Facility: HOSPITAL | Age: 23
Discharge: HOME | End: 2023-07-20
Attending: SPECIALIST
Payer: COMMERCIAL

## 2023-07-20 VITALS
DIASTOLIC BLOOD PRESSURE: 63 MMHG | SYSTOLIC BLOOD PRESSURE: 109 MMHG | HEART RATE: 101 BPM | OXYGEN SATURATION: 98 % | TEMPERATURE: 99 F

## 2023-07-20 PROCEDURE — 63600175 PHARM REV CODE 636 W HCPCS: Performed by: SPECIALIST

## 2023-07-20 PROCEDURE — 96372 THER/PROPH/DIAG INJ SC/IM: CPT | Performed by: SPECIALIST

## 2023-07-20 PROCEDURE — 99284 EMERGENCY DEPT VISIT MOD MDM: CPT

## 2023-07-20 RX ORDER — TERBUTALINE SULFATE 1 MG/ML
0.25 INJECTION SUBCUTANEOUS ONCE
Status: COMPLETED | OUTPATIENT
Start: 2023-07-20 | End: 2023-07-20

## 2023-07-20 RX ADMIN — TERBUTALINE SULFATE 0.25 MG: 1 INJECTION SUBCUTANEOUS at 10:07

## 2023-07-21 NOTE — ED PROVIDER NOTES
[facility] NOTE     Admit Date: 2023  HEMAL[facility]HA Physician: Michael Peña  Primary OBGYN: Dr. Syed    Admit Diagnosis/Chief Complaint:       Chief Complaint   Patient presents with    Abdominal Pain     Contractions that have been ongoing but worsened around 2100.       Hospital Course:  Antonella Ackerman is a 23 y.o.  at 33w5d presents complaining of  uterine contractions.  Patient reports she is had contractions off and on for several days.  Tonight the contractions felt a little more frequent and intense.  Patient reports she has been drinking a large amount of water today and does not feel dehydrated.    This IUP is complicated by no reported problems.    Patient denies vaginal bleeding, leakage of fluid, headache, vision changes, RUQ pain, dysuria, fever, and nausea/vomiting.  Fetal Movement: normal.      /63   Pulse 94   Temp 98.7 °F (37.1 °C)   SpO2 97%   Temp:  [98.7 °F (37.1 °C)] 98.7 °F (37.1 °C)  Pulse:  [72-94] 94  SpO2:  [97 %] 97 %  BP: (109)/(63) 109/63    General: in no respiratory distress and acyanotic  Cardiovascular: regular rate and rhythm no murmurs  Respiratory: clear to auscultation, no wheezes, rales or rhonchi, symmetric air entry  Abdominal: fundus soft, nontender 34 weeks size  Back: lumbar tenderness present CVA tenderness none  Extremeties no redness or tenderness in the calves or thighs no edema    SSE:   SVE:  1 cm, thick, ballotable, membranes intact      FHT: Category 1  TOCO:  Contraction pattern is irregular and appears to be more irritability.    Medical Decision Making:  The patient reported that the contractions several days ago were relieved with terbutaline.  We will administer terbutaline at this time and evaluate after its effect.    LABS:   No results found for this or any previous visit (from the past 24 hour(s)).  [unfilled]     Imaging Results    None          ASSESMENT: Antonella Ackerman is a 23 y.o.   at 33w5d with uterine  contractions.  Resolved after administration of terbutaline.    Discharge Diagnosis/Clinical Impression:  Pregnancy 33 weeks uterine inertia    Status:Improved/stable    Disposition:  discharged to home    Medications:       Patient Instructions:   Current Discharge Medication List        CONTINUE these medications which have NOT CHANGED    Details   busPIRone (BUSPAR) 15 MG tablet Take 15 mg by mouth every evening.      levothyroxine (SYNTHROID) 75 MCG tablet Take 75 mcg by mouth.      melatonin 10 mg Chew Takes 15 mg qhs      nitrofurantoin, macrocrystal-monohydrate, (MACROBID) 100 MG capsule Take 1 capsule (100 mg total) by mouth 2 (two) times daily.  Qty: 5 capsule, Refills: 0      prenatal vit no.124/iron/folic (PRENATAL VITAMIN ORAL) Take by mouth.      !! risperiDONE (RISPERDAL) 0.5 MG Tab Take 1.5 mg by mouth every morning.      !! risperiDONE (RISPERDAL) 3 MG Tab Take 3 mg by mouth nightly.       !! - Potential duplicate medications found. Please discuss with provider.          - Pt was given routine pregnancy instructions including to return to triage if she had any vaginal bleeding (other than spotting for the next 48hrs), any loss of fluid like her water broke, decreased fetal movement, or contractions Q 5min lasting for 2 or more hours. Pt was also instructed to drink copious water. Patient voiced understanding of all these instructions and was subsequently discharged home.    She will follow up with her primary OB.    No discharge procedures on file.    Michael Peña MD  OB-GYN Hospitalist       Michael Peña MD  07/20/23 4798

## 2023-07-31 ENCOUNTER — HOSPITAL ENCOUNTER (EMERGENCY)
Facility: HOSPITAL | Age: 23
Discharge: HOME | End: 2023-07-31
Attending: OBSTETRICS & GYNECOLOGY
Payer: COMMERCIAL

## 2023-07-31 VITALS
HEART RATE: 75 BPM | TEMPERATURE: 97 F | DIASTOLIC BLOOD PRESSURE: 57 MMHG | SYSTOLIC BLOOD PRESSURE: 120 MMHG | RESPIRATION RATE: 20 BRPM

## 2023-07-31 PROCEDURE — 99284 EMERGENCY DEPT VISIT MOD MDM: CPT

## 2023-08-01 NOTE — ED PROVIDER NOTES
HEMALATHA NOTE     Admit Date: 2023  HEMALATHA Physician: Alan Mehta  Primary OBGYN: Dr. Syed    Admit Diagnosis/Chief Complaint:  Elevated home blood pressure reading      Chief Complaint   Patient presents with    Hypertension       Hospital Course:  Antonella Ackerman is a 23 y.o.  at 35w2d presents with elevated blood pressure at home  This IUP is complicated by hypothyroid and psych.    Patient denies vaginal bleeding, leakage of fluid, contractions, RUQ pain, and nausea/vomiting.  Fetal Movement: normal.    /70 (BP Location: Left arm, Patient Position: Sitting)   Pulse 73   Resp 20   Breastfeeding Unknown   Pulse:  [73] 73  Resp:  [20] 20  BP: (118)/(70) 118/70    General: in no apparent distress  Abdominal: soft, nontender, nondistended, no abnormal masses, no epigastric pain FHT present  Back: not examined   CVA tenderness not examined  Extremeties no redness or tenderness in the calves or thighs no edema    Reflexes +2,no Clonus    SVE:      FHT:  Reactive  TOCO: Contractions irritability      LABS:   No results found for this or any previous visit (from the past 24 hour(s)).  [unfilled]     Imaging Results    None          ASSESMENT: Antonella Ackerman is a 23 y.o.   at 35w2d with Elevated Blood Pressure in Pregnancy  Observation in [facility]EMAL[facility]HA  Normotensive  HELLP Labs not indicated  Reviewed blood pressure precautions and comparing blood pressure cuff to MD office  ER precautions reviewed with patient  Patient was given an opportunity to ask questions        Discharge Diagnosis/Clinical Impression**: Hypertension in Pregnancy rule out Preeclampsia  Status:Stable    Patient Instructions:   -Hypertensive precautions reviewed    - Pt was given routine pregnancy instructions including to return to triage if she had any vaginal bleeding (other than spotting for the next 48hrs), any loss of fluid like her water broke, decreased fetal movement, or contractions Q 5min lasting for 2 or  more hours. Pt was also instructed to drink copious water. Patient voiced understanding of all these instructions and was subsequently discharged home.    She will follow up with her primary OB.        This note was created with the assistance of [employer] voice recognition software. There may be transcription errors as a result of using this technology however minimal. Effort has been made to assure accuracy of transcription but any obvious errors or omissions should be clarified with the author of the document.

## 2023-08-05 ENCOUNTER — HOSPITAL ENCOUNTER (EMERGENCY)
Facility: HOSPITAL | Age: 23
Discharge: HOME | End: 2023-08-05
Payer: COMMERCIAL

## 2023-08-05 VITALS
OXYGEN SATURATION: 97 % | DIASTOLIC BLOOD PRESSURE: 69 MMHG | HEART RATE: 61 BPM | SYSTOLIC BLOOD PRESSURE: 129 MMHG | TEMPERATURE: 98 F

## 2023-08-05 PROCEDURE — 99284 EMERGENCY DEPT VISIT MOD MDM: CPT

## 2023-08-05 NOTE — ED PROVIDER NOTES
HEMALATHA NOTE     Admit Date: 2023  HEMALATHA Physician: Norris Simmons  Primary OBGYN: Dr. Syed    Admit Diagnosis/Chief Complaint: abdominal pain      Chief Complaint   Patient presents with    Abdominal Pain     G1 36 week iup with c/o contractions beginning at 0600 this am; pt states that she does not know how often they are occurring and has not timed them       Hospital Course:  Antonella Ackerman is a 23 y.o.  at 36w0d presents complaining of abdominal pain after intercourse.  Deniesd bleeding leaking, or overt contractions.  + FM  This IUP is complicated by Hypothyroid and psych disorder     Patient denies vaginal bleeding, leakage of fluid, contractions, headache, and vision changes.  Fetal Movement: normal.    /69   Pulse 61   Temp 98.2 °F (36.8 °C)   SpO2 97%   Breastfeeding No   Temp:  [98.2 °F (36.8 °C)] 98.2 °F (36.8 °C)  Pulse:  [61] 61  SpO2:  [97 %] 97 %  BP: (129)/(69) 129/69    General: in no apparent distress  Abdominal: FHT present  Back:       CVA tenderness none  Extremeties no redness or tenderness in the calves or thighs no edema    SSE:   SVE:SVE (PeriWATCH)  Dilation (cm): 1  Effacement (%): 50  Station: -3  Cervical Position: Posterior  Cervical Consistency: Firm  Examined by:: FRANSISCO Vann Rn  Zhao Score: 2  Simplified Zhao Score: 2     FHT:  Reactive  TOCO: Contractions irritability      LABS:   No results found for this or any previous visit (from the past 24 hour(s)).  [unfilled]     Imaging Results    None          ASSESMENT: Antonella Ackerman is a 23 y.o.   at 36w0d with abdominal pain after intercourse. I spent around 10 minutes discussing normal pains in pregnancy and what labor possibly could feel like.  No dysuria or vaginal discharge.  Plan to DC with reactive stip and precautions of when to come back  Observation in [facility]EMAL[facility]    Labor precautions reviewed with patient  ER precautions reviewed with patient  Patient was given an opportunity to ask  questions  Patient is to follow-up with her primary care physician        Discharge Diagnosis/Clinical Impressionabdominal pain in pregnancy  Status:Stable    Patient Instructions:       - Pt was given routine pregnancy instructions including to return to triage if she had any vaginal bleeding (other than spotting for the next 48hrs), any loss of fluid like her water broke, decreased fetal movement, or contractions Q 5min lasting for 2 or more hours. Pt was also instructed to drink copious water. Patient voiced understanding of all these instructions and was subsequently discharged home.    She will follow up with her primary OB.      This note was created with the assistance of [employer] voice recognition software. There may be transcription errors as a result of using this technology however minimal. Effort has been made to assure accuracy of transcription but any obvious errors or omissions should be clarified with the author of the document.  Abdominal pain

## 2023-08-07 DIAGNOSIS — F31.9 BIPOLAR DISEASE DURING PREGNANCY IN THIRD TRIMESTER: ICD-10-CM

## 2023-08-07 DIAGNOSIS — O99.283 HYPOTHYROIDISM AFFECTING PREGNANCY IN THIRD TRIMESTER: Primary | ICD-10-CM

## 2023-08-07 DIAGNOSIS — G90.A POTS (POSTURAL ORTHOSTATIC TACHYCARDIA SYNDROME): ICD-10-CM

## 2023-08-07 DIAGNOSIS — O99.213 OBESITY AFFECTING PREGNANCY IN THIRD TRIMESTER: ICD-10-CM

## 2023-08-07 DIAGNOSIS — O99.343 BIPOLAR DISEASE DURING PREGNANCY IN THIRD TRIMESTER: ICD-10-CM

## 2023-08-07 DIAGNOSIS — E03.9 HYPOTHYROIDISM AFFECTING PREGNANCY IN THIRD TRIMESTER: Primary | ICD-10-CM

## 2023-08-08 ENCOUNTER — PROCEDURE VISIT (OUTPATIENT)
Dept: MATERNAL FETAL MEDICINE | Facility: CLINIC | Age: 23
End: 2023-08-08
Payer: COMMERCIAL

## 2023-08-08 ENCOUNTER — OFFICE VISIT (OUTPATIENT)
Dept: MATERNAL FETAL MEDICINE | Facility: CLINIC | Age: 23
End: 2023-08-08
Payer: COMMERCIAL

## 2023-08-08 VITALS
BODY MASS INDEX: 41.1 KG/M2 | DIASTOLIC BLOOD PRESSURE: 63 MMHG | SYSTOLIC BLOOD PRESSURE: 131 MMHG | HEART RATE: 95 BPM | WEIGHT: 210.44 LBS

## 2023-08-08 DIAGNOSIS — F31.9 BIPOLAR DISEASE DURING PREGNANCY IN THIRD TRIMESTER: ICD-10-CM

## 2023-08-08 DIAGNOSIS — G90.A POTS (POSTURAL ORTHOSTATIC TACHYCARDIA SYNDROME): ICD-10-CM

## 2023-08-08 DIAGNOSIS — O99.213 OBESITY AFFECTING PREGNANCY IN THIRD TRIMESTER: ICD-10-CM

## 2023-08-08 DIAGNOSIS — F31.9 BIPOLAR DISEASE DURING PREGNANCY IN SECOND TRIMESTER: ICD-10-CM

## 2023-08-08 DIAGNOSIS — O99.343 BIPOLAR DISEASE DURING PREGNANCY IN THIRD TRIMESTER: ICD-10-CM

## 2023-08-08 DIAGNOSIS — O99.342 BIPOLAR DISEASE DURING PREGNANCY IN SECOND TRIMESTER: ICD-10-CM

## 2023-08-08 DIAGNOSIS — O99.283 HYPOTHYROIDISM AFFECTING PREGNANCY IN THIRD TRIMESTER: ICD-10-CM

## 2023-08-08 DIAGNOSIS — O99.212 OBESITY AFFECTING PREGNANCY IN SECOND TRIMESTER: ICD-10-CM

## 2023-08-08 DIAGNOSIS — O99.282 HYPOTHYROIDISM AFFECTING PREGNANCY IN SECOND TRIMESTER: ICD-10-CM

## 2023-08-08 DIAGNOSIS — E03.9 HYPOTHYROIDISM AFFECTING PREGNANCY IN THIRD TRIMESTER: ICD-10-CM

## 2023-08-08 DIAGNOSIS — E03.9 HYPOTHYROIDISM AFFECTING PREGNANCY IN SECOND TRIMESTER: ICD-10-CM

## 2023-08-08 PROCEDURE — 1159F PR MEDICATION LIST DOCUMENTED IN MEDICAL RECORD: ICD-10-PCS | Mod: CPTII,S$GLB,, | Performed by: OBSTETRICS & GYNECOLOGY

## 2023-08-08 PROCEDURE — 3078F DIAST BP <80 MM HG: CPT | Mod: CPTII,S$GLB,, | Performed by: OBSTETRICS & GYNECOLOGY

## 2023-08-08 PROCEDURE — 3078F PR MOST RECENT DIASTOLIC BLOOD PRESSURE < 80 MM HG: ICD-10-PCS | Mod: CPTII,S$GLB,, | Performed by: OBSTETRICS & GYNECOLOGY

## 2023-08-08 PROCEDURE — 1160F RVW MEDS BY RX/DR IN RCRD: CPT | Mod: CPTII,S$GLB,, | Performed by: OBSTETRICS & GYNECOLOGY

## 2023-08-08 PROCEDURE — 76816 OB US FOLLOW-UP PER FETUS: CPT | Mod: S$GLB,,, | Performed by: OBSTETRICS & GYNECOLOGY

## 2023-08-08 PROCEDURE — 76819 FETAL BIOPHYS PROFIL W/O NST: CPT | Mod: S$GLB,,, | Performed by: OBSTETRICS & GYNECOLOGY

## 2023-08-08 PROCEDURE — 3075F SYST BP GE 130 - 139MM HG: CPT | Mod: CPTII,S$GLB,, | Performed by: OBSTETRICS & GYNECOLOGY

## 2023-08-08 PROCEDURE — 1159F MED LIST DOCD IN RCRD: CPT | Mod: CPTII,S$GLB,, | Performed by: OBSTETRICS & GYNECOLOGY

## 2023-08-08 PROCEDURE — 3075F PR MOST RECENT SYSTOLIC BLOOD PRESS GE 130-139MM HG: ICD-10-PCS | Mod: CPTII,S$GLB,, | Performed by: OBSTETRICS & GYNECOLOGY

## 2023-08-08 PROCEDURE — 1160F PR REVIEW ALL MEDS BY PRESCRIBER/CLIN PHARMACIST DOCUMENTED: ICD-10-PCS | Mod: CPTII,S$GLB,, | Performed by: OBSTETRICS & GYNECOLOGY

## 2023-08-08 PROCEDURE — 99214 OFFICE O/P EST MOD 30 MIN: CPT | Mod: 25,S$GLB,, | Performed by: OBSTETRICS & GYNECOLOGY

## 2023-08-08 PROCEDURE — 99214 PR OFFICE/OUTPT VISIT, EST, LEVL IV, 30-39 MIN: ICD-10-PCS | Mod: 25,S$GLB,, | Performed by: OBSTETRICS & GYNECOLOGY

## 2023-08-08 PROCEDURE — 3008F BODY MASS INDEX DOCD: CPT | Mod: CPTII,S$GLB,, | Performed by: OBSTETRICS & GYNECOLOGY

## 2023-08-08 PROCEDURE — 76816 PR  US,PREGNANT UTERUS,F/U,TRANSABD APP: ICD-10-PCS | Mod: S$GLB,,, | Performed by: OBSTETRICS & GYNECOLOGY

## 2023-08-08 PROCEDURE — 3008F PR BODY MASS INDEX (BMI) DOCUMENTED: ICD-10-PCS | Mod: CPTII,S$GLB,, | Performed by: OBSTETRICS & GYNECOLOGY

## 2023-08-08 PROCEDURE — 76819 PR US, OB, FETAL BIOPHYSICAL, W/O NST: ICD-10-PCS | Mod: S$GLB,,, | Performed by: OBSTETRICS & GYNECOLOGY

## 2023-08-08 RX ORDER — PANTOPRAZOLE SODIUM 20 MG/1
20 TABLET, DELAYED RELEASE ORAL DAILY
Qty: 30 TABLET | Refills: 1 | Status: SHIPPED | OUTPATIENT
Start: 2023-08-08 | End: 2024-08-07

## 2023-08-08 NOTE — ASSESSMENT & PLAN NOTE
She was previously diagnosed with POTS and has been followed by Dr Villatoro, cardiologist. She has routine appts annually and was last evaluated in October of 2022. She was taking Atenolol prior to pregnancy but discontinued in October.    She reports relatively stable symptoms since becoming pregnant and being without medication. She states every now and then she has a flare up of high heart rate and low blood pressure. When she experiences these exacerbations, she increases sodium and water intake which helps a little. She denies severe symptoms or need for starting medication during pregnancy.    We discussed reducing salt intake with her current swelling.

## 2023-08-08 NOTE — ASSESSMENT & PLAN NOTE
See prior documentation for specific counseling    BMI 41    Recommendations:   Discussed that TWG goal is 11-20 lbs   Screen for signs/symptoms of obstructive sleep apnea   Nutritionist consult offered (this is to be ordered by primary OB provider)   Consider early 1 hr GCT (between 14-16 weeks gestation); repeat at 24-28 weeks gestation   Start low dose aspirin 81 mg daily at 12-16 weeks for preeclampsia risk reduction   Targeted anatomical survey scheduled at 18-20 weeks   Fetal growth ultrasound at 32 weeks if BMI >= 40   Lovenox 40 mg BID for VTE prophylaxis while admitted to the hospital (antepartum or postpartum) if BMI >= 40.    Encouraged breastfeeding   Postpartum lifestyle modifications & weight loss

## 2023-08-08 NOTE — PROGRESS NOTES
Maternal Fetal Medicine follow up consult    SUBJECTIVE:     Antonella Ackerman is a 23 y.o.  female with IUP at 36w3d who is seen in follow up consultation by MFM.  Pregnancy complications include:   Problem   1. Hypothyroidism affecting pregnancy in second trimester   3. Bipolar disease during pregnancy in second trimester   2. BMI affecting pregnancy in second trimester   4. POTS (postural orthostatic tachycardia syndrome)         She is having significant GERD not responsive to tums. She is requesting medication today and I sent protonix.     She is reporting some headaches and Bps 130s/80s at home (high for her). She has LE edema. She states [facility] is watching these issues and she dropped off urine and bloodwork today to them.     Denies LOF, VB, contractions. Reports positive fetal movement.    Previous notes reviewed.   No changes to medical, surgical, family, social, or obstetric history.    Interval history since last MFM visit: no changes    Medications:  Current Outpatient Medications   Medication Instructions    busPIRone (BUSPAR) 15 mg, Oral, Nightly    levothyroxine (SYNTHROID) 75 mcg, Oral    melatonin 10 mg Chew Takes 15 mg qhs    pantoprazole (PROTONIX) 20 mg, Oral, Daily    prenatal vit no.124/iron/folic (PRENATAL VITAMIN ORAL) Oral    risperiDONE (RISPERDAL) 3 mg, Oral, Nightly    risperiDONE (RISPERDAL) 1.5 mg, Oral, Every morning       Care team members:  Dr Syed - Primary OB       OBJECTIVE:   /63 (BP Location: Right arm)   Pulse 95   Wt 95.5 kg (210 lb 6.9 oz)   BMI 41.10 kg/m²     Ultrasound performed. See viewpoint for full ultrasound report.    A viable cerda pregnancy is visualized in cephalic presentation.  Estimated fetal weight is at the 23rd percentile with an abdominal circumference at the 35th percentile.    No fetal abnormalities are noted and previous anatomic survey was normal. Amniotic fluid volume is normal.  Placenta is anterior. Biophysical  profile Is .     ASSESSMENT/PLAN:     23 y.o.  female with IUP at 36w3d    1. Hypothyroidism affecting pregnancy in second trimester  Thyroid requirements increase in pregnancy due to increases in metabolism and thyroid binding globulin. Thyroid function tests should be followed closely to ensure adequate treatment.  Hypothyroidism has been associated with higher pregnancy complication rates including miscarriage, preeclampsia, abruption, low birth weight and stillbirth. Untreated hypothyroidism has also been associated with adverse fetal neurological development, but well treated hypothyroidism (i.e., euthyroid state) carries an excellent prognosis for mother and baby.      23- TSH 2.4, FT4 1.59.   Labs have been normal per her report.    Recommendations:  Medication management:  Continue Synthroid at the current dose of 75 micrograms/day   Up to 1/3 of women may require increased hormone replacement in the first trimester, thus consider an empiric 25% increase in medication dose at pregnancy confirmation, while discharging postpartum patients on their pre-pregnancy dose.  Laboratory evaluation:   TSH should be monitored at least every trimester, or every 4 weeks after any medication adjustment. Adjust treatment as necessary to maintain TSH goal < 2.5 mIU/L.      4. POTS (postural orthostatic tachycardia syndrome)  She was previously diagnosed with POTS and has been followed by Dr Villatoro, cardiologist. She has routine appts annually and was last evaluated in 2022. She was taking Atenolol prior to pregnancy but discontinued in October.    She reports relatively stable symptoms since becoming pregnant and being without medication. She states every now and then she has a flare up of high heart rate and low blood pressure. When she experiences these exacerbations, she increases sodium and water intake which helps a little. She denies severe symptoms or need for starting medication during  pregnancy.    We discussed reducing salt intake with her current swelling.    2. BMI affecting pregnancy in second trimester  See prior documentation for specific counseling    BMI 41    Recommendations:  Discussed that TWG goal is 11-20 lbs  Screen for signs/symptoms of obstructive sleep apnea  Nutritionist consult offered (this is to be ordered by primary OB provider)  Consider early 1 hr GCT (between 14-16 weeks gestation); repeat at 24-28 weeks gestation  Start low dose aspirin 81 mg daily at 12-16 weeks for preeclampsia risk reduction  Targeted anatomical survey scheduled at 18-20 weeks  Fetal growth ultrasound at 32 weeks if BMI >= 40  Lovenox 40 mg BID for VTE prophylaxis while admitted to the hospital (antepartum or postpartum) if BMI >= 40.   Encouraged breastfeeding  Postpartum lifestyle modifications & weight loss      3. Bipolar disease during pregnancy in second trimester  She reports a history of bipolar and is taking Risperdal 1.5mg QAM and 3mg QPM daily. She is also taking Buspar. She reports improvement of symptoms with the utilization of this medication regimen. Discussed increased risk for peripartum and postpartum mood disorders. Precautions provided.  She is established with a mental health provider and has follow up appointments scheduled Q3 months. She was on Depakote in the past and discontinued in October of 2022. LMP at the end of November 2022. She plans to restart this regimen after delivery under the direction of her mental health provider.    We have discussed the importance of optimization of mental health conditions during pregnancy in an effort to reduce the risk of postpartum mood disorders. We have discussed options for management including psychotherapy, counseling, cognitive behavioral therapy, exercise/yoga, journaling, and psychiatry services. She will notify our office if she is interested in being referred for any of the above.    F/u in 1 week for BPP (monitoring of  materrnal concerns with elevated BP at home- does not meet criteria for GHTN).    Yamila Dhillon MD  Maternal Fetal Medicine

## 2023-08-09 ENCOUNTER — HOSPITAL ENCOUNTER (INPATIENT)
Facility: HOSPITAL | Age: 23
LOS: 3 days | Discharge: HOME | End: 2023-08-13
Attending: OBSTETRICS & GYNECOLOGY | Admitting: OBSTETRICS & GYNECOLOGY
Payer: COMMERCIAL

## 2023-08-09 DIAGNOSIS — O16.9 ELEVATED BLOOD PRESSURE AFFECTING PREGNANCY, ANTEPARTUM: ICD-10-CM

## 2023-08-09 DIAGNOSIS — O36.8390 FETAL HEART RATE DECELERATIONS AFFECTING MANAGEMENT OF MOTHER: ICD-10-CM

## 2023-08-09 LAB
ALBUMIN SERPL-MCNC: 2.8 G/DL (ref 3.5–5)
ALBUMIN/GLOB SERPL: 1 RATIO (ref 1.1–2)
ALP SERPL-CCNC: 145 UNIT/L (ref 40–150)
ALT SERPL-CCNC: 26 UNIT/L (ref 0–55)
AST SERPL-CCNC: 39 UNIT/L (ref 5–34)
BASOPHILS # BLD AUTO: 0.01 X10(3)/MCL
BASOPHILS NFR BLD AUTO: 0.2 %
BILIRUB SERPL-MCNC: 0.3 MG/DL
BUN SERPL-MCNC: 10 MG/DL (ref 7–18.7)
CALCIUM SERPL-MCNC: 9 MG/DL (ref 8.4–10.2)
CHLORIDE SERPL-SCNC: 108 MMOL/L (ref 98–107)
CO2 SERPL-SCNC: 21 MMOL/L (ref 22–29)
CREAT SERPL-MCNC: 0.7 MG/DL (ref 0.55–1.02)
EOSINOPHIL # BLD AUTO: 0.03 X10(3)/MCL (ref 0–0.9)
EOSINOPHIL NFR BLD AUTO: 0.5 %
ERYTHROCYTE [DISTWIDTH] IN BLOOD BY AUTOMATED COUNT: 12.3 % (ref 11.5–17)
GFR SERPLBLD CREATININE-BSD FMLA CKD-EPI: >60 MLS/MIN/1.73/M2
GLOBULIN SER-MCNC: 2.9 GM/DL (ref 2.4–3.5)
GLUCOSE SERPL-MCNC: 81 MG/DL (ref 74–100)
HCT VFR BLD AUTO: 33.7 % (ref 37–47)
HGB BLD-MCNC: 12 G/DL (ref 12–16)
IMM GRANULOCYTES # BLD AUTO: 0.04 X10(3)/MCL (ref 0–0.04)
IMM GRANULOCYTES NFR BLD AUTO: 0.6 %
LDH SERPL-CCNC: 264 U/L (ref 125–220)
LYMPHOCYTES # BLD AUTO: 1.15 X10(3)/MCL (ref 0.6–4.6)
LYMPHOCYTES NFR BLD AUTO: 18.5 %
MCH RBC QN AUTO: 30.5 PG (ref 27–31)
MCHC RBC AUTO-ENTMCNC: 35.6 G/DL (ref 33–36)
MCV RBC AUTO: 85.8 FL (ref 80–94)
MONOCYTES # BLD AUTO: 0.39 X10(3)/MCL (ref 0.1–1.3)
MONOCYTES NFR BLD AUTO: 6.3 %
NEUTROPHILS # BLD AUTO: 4.61 X10(3)/MCL (ref 2.1–9.2)
NEUTROPHILS NFR BLD AUTO: 73.9 %
NRBC BLD AUTO-RTO: 0 %
PLATELET # BLD AUTO: 88 X10(3)/MCL (ref 130–400)
PMV BLD AUTO: 14 FL (ref 7.4–10.4)
POTASSIUM SERPL-SCNC: 4 MMOL/L (ref 3.5–5.1)
PROT SERPL-MCNC: 5.7 GM/DL (ref 6.4–8.3)
RBC # BLD AUTO: 3.93 X10(6)/MCL (ref 4.2–5.4)
SODIUM SERPL-SCNC: 137 MMOL/L (ref 136–145)
URATE SERPL-MCNC: 9.4 MG/DL (ref 2.6–6)
WBC # SPEC AUTO: 6.23 X10(3)/MCL (ref 4.5–11.5)

## 2023-08-09 PROCEDURE — 96372 THER/PROPH/DIAG INJ SC/IM: CPT | Performed by: OBSTETRICS & GYNECOLOGY

## 2023-08-09 PROCEDURE — 80053 COMPREHEN METABOLIC PANEL: CPT | Performed by: OBSTETRICS & GYNECOLOGY

## 2023-08-09 PROCEDURE — 63600175 PHARM REV CODE 636 W HCPCS: Performed by: OBSTETRICS & GYNECOLOGY

## 2023-08-09 PROCEDURE — 83615 LACTATE (LD) (LDH) ENZYME: CPT | Performed by: OBSTETRICS & GYNECOLOGY

## 2023-08-09 PROCEDURE — 72100002 HC LABOR CARE, 1ST 8 HOURS

## 2023-08-09 PROCEDURE — 72100003 HC LABOR CARE, EA. ADDL. 8 HRS

## 2023-08-09 PROCEDURE — 84550 ASSAY OF BLOOD/URIC ACID: CPT | Performed by: OBSTETRICS & GYNECOLOGY

## 2023-08-09 PROCEDURE — 25000003 PHARM REV CODE 250: Performed by: OBSTETRICS & GYNECOLOGY

## 2023-08-09 PROCEDURE — 85025 COMPLETE CBC W/AUTO DIFF WBC: CPT | Performed by: OBSTETRICS & GYNECOLOGY

## 2023-08-09 PROCEDURE — G0378 HOSPITAL OBSERVATION PER HR: HCPCS

## 2023-08-09 PROCEDURE — G0379 DIRECT REFER HOSPITAL OBSERV: HCPCS

## 2023-08-09 RX ORDER — PRENATAL WITH FERROUS FUM AND FOLIC ACID 3080; 920; 120; 400; 22; 1.84; 3; 20; 10; 1; 12; 200; 27; 25; 2 [IU]/1; [IU]/1; MG/1; [IU]/1; MG/1; MG/1; MG/1; MG/1; MG/1; MG/1; UG/1; MG/1; MG/1; MG/1; MG/1
1 TABLET ORAL DAILY
Status: DISCONTINUED | OUTPATIENT
Start: 2023-08-10 | End: 2023-08-10

## 2023-08-09 RX ORDER — PANTOPRAZOLE SODIUM 20 MG/1
20 TABLET, DELAYED RELEASE ORAL DAILY
Status: DISCONTINUED | OUTPATIENT
Start: 2023-08-09 | End: 2023-08-13 | Stop reason: HOSPADM

## 2023-08-09 RX ORDER — BETAMETHASONE SODIUM PHOSPHATE AND BETAMETHASONE ACETATE 3; 3 MG/ML; MG/ML
12 INJECTION, SUSPENSION INTRA-ARTICULAR; INTRALESIONAL; INTRAMUSCULAR; SOFT TISSUE
Status: DISCONTINUED | OUTPATIENT
Start: 2023-08-09 | End: 2023-08-10

## 2023-08-09 RX ORDER — BUSPIRONE HYDROCHLORIDE 5 MG/1
15 TABLET ORAL DAILY
Status: DISCONTINUED | OUTPATIENT
Start: 2023-08-09 | End: 2023-08-13 | Stop reason: HOSPADM

## 2023-08-09 RX ORDER — RISPERIDONE 0.5 MG/1
0.5 TABLET ORAL DAILY
Status: DISCONTINUED | OUTPATIENT
Start: 2023-08-10 | End: 2023-08-11

## 2023-08-09 RX ORDER — TALC
15 POWDER (GRAM) TOPICAL NIGHTLY PRN
Status: DISCONTINUED | OUTPATIENT
Start: 2023-08-09 | End: 2023-08-13 | Stop reason: HOSPADM

## 2023-08-09 RX ORDER — RISPERIDONE 1 MG/1
3 TABLET ORAL DAILY
Status: DISCONTINUED | OUTPATIENT
Start: 2023-08-09 | End: 2023-08-13 | Stop reason: HOSPADM

## 2023-08-09 RX ORDER — LEVOTHYROXINE SODIUM 25 UG/1
75 TABLET ORAL DAILY
Status: DISCONTINUED | OUTPATIENT
Start: 2023-08-10 | End: 2023-08-13 | Stop reason: HOSPADM

## 2023-08-09 RX ADMIN — BETAMETHASONE SODIUM PHOSPHATE AND BETAMETHASONE ACETATE 12 MG: 3; 3 INJECTION, SUSPENSION INTRA-ARTICULAR; INTRALESIONAL; INTRAMUSCULAR at 04:08

## 2023-08-09 RX ADMIN — RISPERIDONE 3 MG: 1 TABLET ORAL at 10:08

## 2023-08-09 RX ADMIN — Medication 15 MG: at 10:08

## 2023-08-09 RX ADMIN — PANTOPRAZOLE SODIUM 20 MG: 20 TABLET, DELAYED RELEASE ORAL at 10:08

## 2023-08-09 RX ADMIN — BUSPIRONE HYDROCHLORIDE 15 MG: 5 TABLET ORAL at 10:08

## 2023-08-10 ENCOUNTER — ANESTHESIA EVENT (OUTPATIENT)
Dept: OBSTETRICS AND GYNECOLOGY | Facility: HOSPITAL | Age: 23
End: 2023-08-10
Payer: COMMERCIAL

## 2023-08-10 ENCOUNTER — ANESTHESIA EVENT (OUTPATIENT)
Dept: OBSTETRICS AND GYNECOLOGY | Facility: HOSPITAL | Age: 23
End: 2023-08-10

## 2023-08-10 ENCOUNTER — ANESTHESIA (OUTPATIENT)
Dept: OBSTETRICS AND GYNECOLOGY | Facility: HOSPITAL | Age: 23
End: 2023-08-10
Payer: COMMERCIAL

## 2023-08-10 ENCOUNTER — ANESTHESIA (OUTPATIENT)
Dept: OBSTETRICS AND GYNECOLOGY | Facility: HOSPITAL | Age: 23
End: 2023-08-10

## 2023-08-10 LAB
ABORH RETYPE: NORMAL
ALBUMIN SERPL-MCNC: 2.2 G/DL (ref 3.5–5)
ALBUMIN SERPL-MCNC: 2.4 G/DL (ref 3.5–5)
ALBUMIN/GLOB SERPL: 0.9 RATIO (ref 1.1–2)
ALBUMIN/GLOB SERPL: 1 RATIO (ref 1.1–2)
ALP SERPL-CCNC: 104 UNIT/L (ref 40–150)
ALP SERPL-CCNC: 128 UNIT/L (ref 40–150)
ALT SERPL-CCNC: 16 UNIT/L (ref 0–55)
ALT SERPL-CCNC: 20 UNIT/L (ref 0–55)
AST SERPL-CCNC: 24 UNIT/L (ref 5–34)
AST SERPL-CCNC: 27 UNIT/L (ref 5–34)
BASOPHILS # BLD AUTO: 0 X10(3)/MCL
BASOPHILS # BLD AUTO: 0.01 X10(3)/MCL
BASOPHILS NFR BLD AUTO: 0 %
BASOPHILS NFR BLD AUTO: 0.1 %
BILIRUB SERPL-MCNC: 0.2 MG/DL
BILIRUB SERPL-MCNC: 0.3 MG/DL
BUN SERPL-MCNC: 8.4 MG/DL (ref 7–18.7)
BUN SERPL-MCNC: 9.5 MG/DL (ref 7–18.7)
CALCIUM SERPL-MCNC: 7.7 MG/DL (ref 8.4–10.2)
CALCIUM SERPL-MCNC: 8.5 MG/DL (ref 8.4–10.2)
CHLORIDE SERPL-SCNC: 107 MMOL/L (ref 98–107)
CHLORIDE SERPL-SCNC: 109 MMOL/L (ref 98–107)
CO2 SERPL-SCNC: 21 MMOL/L (ref 22–29)
CO2 SERPL-SCNC: 22 MMOL/L (ref 22–29)
CREAT SERPL-MCNC: 0.65 MG/DL (ref 0.55–1.02)
CREAT SERPL-MCNC: 0.69 MG/DL (ref 0.55–1.02)
EOSINOPHIL # BLD AUTO: 0 X10(3)/MCL (ref 0–0.9)
EOSINOPHIL # BLD AUTO: 0 X10(3)/MCL (ref 0–0.9)
EOSINOPHIL NFR BLD AUTO: 0 %
EOSINOPHIL NFR BLD AUTO: 0 %
ERYTHROCYTE [DISTWIDTH] IN BLOOD BY AUTOMATED COUNT: 12 % (ref 11.5–17)
ERYTHROCYTE [DISTWIDTH] IN BLOOD BY AUTOMATED COUNT: 12.3 % (ref 11.5–17)
GFR SERPLBLD CREATININE-BSD FMLA CKD-EPI: >60 MLS/MIN/1.73/M2
GFR SERPLBLD CREATININE-BSD FMLA CKD-EPI: >60 MLS/MIN/1.73/M2
GLOBULIN SER-MCNC: 2.3 GM/DL (ref 2.4–3.5)
GLOBULIN SER-MCNC: 2.6 GM/DL (ref 2.4–3.5)
GLUCOSE SERPL-MCNC: 120 MG/DL (ref 74–100)
GLUCOSE SERPL-MCNC: 139 MG/DL (ref 74–100)
GROUP & RH: NORMAL
HCT VFR BLD AUTO: 29.7 % (ref 37–47)
HCT VFR BLD AUTO: 30 % (ref 37–47)
HGB BLD-MCNC: 10.4 G/DL (ref 12–16)
HGB BLD-MCNC: 10.8 G/DL (ref 12–16)
IMM GRANULOCYTES # BLD AUTO: 0.07 X10(3)/MCL (ref 0–0.04)
IMM GRANULOCYTES # BLD AUTO: 0.08 X10(3)/MCL (ref 0–0.04)
IMM GRANULOCYTES NFR BLD AUTO: 0.8 %
IMM GRANULOCYTES NFR BLD AUTO: 1.2 %
INDIRECT COOMBS GEL: NORMAL
LDH SERPL-CCNC: 239 U/L (ref 125–220)
LYMPHOCYTES # BLD AUTO: 0.92 X10(3)/MCL (ref 0.6–4.6)
LYMPHOCYTES # BLD AUTO: 1.05 X10(3)/MCL (ref 0.6–4.6)
LYMPHOCYTES NFR BLD AUTO: 12.5 %
LYMPHOCYTES NFR BLD AUTO: 14.2 %
MAGNESIUM SERPL-MCNC: 4.7 MG/DL (ref 1.6–2.6)
MCH RBC QN AUTO: 30 PG (ref 27–31)
MCH RBC QN AUTO: 30.1 PG (ref 27–31)
MCHC RBC AUTO-ENTMCNC: 35 G/DL (ref 33–36)
MCHC RBC AUTO-ENTMCNC: 36 G/DL (ref 33–36)
MCV RBC AUTO: 83.6 FL (ref 80–94)
MCV RBC AUTO: 85.6 FL (ref 80–94)
MONOCYTES # BLD AUTO: 0.25 X10(3)/MCL (ref 0.1–1.3)
MONOCYTES # BLD AUTO: 0.49 X10(3)/MCL (ref 0.1–1.3)
MONOCYTES NFR BLD AUTO: 3.8 %
MONOCYTES NFR BLD AUTO: 5.8 %
NEUTROPHILS # BLD AUTO: 5.25 X10(3)/MCL (ref 2.1–9.2)
NEUTROPHILS # BLD AUTO: 6.78 X10(3)/MCL (ref 2.1–9.2)
NEUTROPHILS NFR BLD AUTO: 80.8 %
NEUTROPHILS NFR BLD AUTO: 80.8 %
NRBC BLD AUTO-RTO: 0 %
NRBC BLD AUTO-RTO: 0 %
PLATELET # BLD AUTO: 77 X10(3)/MCL (ref 130–400)
PLATELET # BLD AUTO: 78 X10(3)/MCL (ref 130–400)
PMV BLD AUTO: 13.1 FL (ref 7.4–10.4)
PMV BLD AUTO: 13.7 FL (ref 7.4–10.4)
POTASSIUM SERPL-SCNC: 4 MMOL/L (ref 3.5–5.1)
POTASSIUM SERPL-SCNC: 4 MMOL/L (ref 3.5–5.1)
PROT SERPL-MCNC: 4.5 GM/DL (ref 6.4–8.3)
PROT SERPL-MCNC: 5 GM/DL (ref 6.4–8.3)
RBC # BLD AUTO: 3.47 X10(6)/MCL (ref 4.2–5.4)
RBC # BLD AUTO: 3.59 X10(6)/MCL (ref 4.2–5.4)
RH IMMUNE GLOBULIN: NORMAL
ROSETTE - FMH (FETAL BLEED SCREEN): NORMAL
SODIUM SERPL-SCNC: 137 MMOL/L (ref 136–145)
SODIUM SERPL-SCNC: 139 MMOL/L (ref 136–145)
SPECIMEN OUTDATE: NORMAL
T PALLIDUM AB SER QL: NONREACTIVE
URATE SERPL-MCNC: 9.8 MG/DL (ref 2.6–6)
WBC # SPEC AUTO: 6.5 X10(3)/MCL (ref 4.5–11.5)
WBC # SPEC AUTO: 8.4 X10(3)/MCL (ref 4.5–11.5)

## 2023-08-10 PROCEDURE — 86780 TREPONEMA PALLIDUM: CPT | Performed by: OBSTETRICS & GYNECOLOGY

## 2023-08-10 PROCEDURE — 71000033 HC RECOVERY, INTIAL HOUR: Performed by: OBSTETRICS & GYNECOLOGY

## 2023-08-10 PROCEDURE — 27000492 HC SLEEVE, SCD T/L

## 2023-08-10 PROCEDURE — 83735 ASSAY OF MAGNESIUM: CPT | Performed by: OBSTETRICS & GYNECOLOGY

## 2023-08-10 PROCEDURE — 25000003 PHARM REV CODE 250: Performed by: OBSTETRICS & GYNECOLOGY

## 2023-08-10 PROCEDURE — 63600519 RHOGAM PHARM REV CODE 636 ALT 250 W HCPCS: Performed by: OBSTETRICS & GYNECOLOGY

## 2023-08-10 PROCEDURE — 59514 PRA REAN DELIVERY ONLY: ICD-10-PCS | Mod: QY,ANES,, | Performed by: STUDENT IN AN ORGANIZED HEALTH CARE EDUCATION/TRAINING PROGRAM

## 2023-08-10 PROCEDURE — 59514 CESAREAN DELIVERY ONLY: CPT | Mod: QY,ANES,, | Performed by: STUDENT IN AN ORGANIZED HEALTH CARE EDUCATION/TRAINING PROGRAM

## 2023-08-10 PROCEDURE — 27200918 HC ALEXIS O RING

## 2023-08-10 PROCEDURE — 86900 BLOOD TYPING SEROLOGIC ABO: CPT | Performed by: OBSTETRICS & GYNECOLOGY

## 2023-08-10 PROCEDURE — 37000009 HC ANESTHESIA EA ADD 15 MINS: Performed by: OBSTETRICS & GYNECOLOGY

## 2023-08-10 PROCEDURE — 99234 PR OBSERV/HOSP SAME DATE,LEVL III: ICD-10-PCS | Mod: ,,, | Performed by: NURSE PRACTITIONER

## 2023-08-10 PROCEDURE — 62322 NJX INTERLAMINAR LMBR/SAC: CPT | Performed by: STUDENT IN AN ORGANIZED HEALTH CARE EDUCATION/TRAINING PROGRAM

## 2023-08-10 PROCEDURE — 83615 LACTATE (LD) (LDH) ENZYME: CPT | Performed by: OBSTETRICS & GYNECOLOGY

## 2023-08-10 PROCEDURE — 99234 HOSP IP/OBS SM DT SF/LOW 45: CPT | Mod: ,,, | Performed by: NURSE PRACTITIONER

## 2023-08-10 PROCEDURE — 63600175 PHARM REV CODE 636 W HCPCS: Performed by: OBSTETRICS & GYNECOLOGY

## 2023-08-10 PROCEDURE — 72100003 HC LABOR CARE, EA. ADDL. 8 HRS

## 2023-08-10 PROCEDURE — 11000001 HC ACUTE MED/SURG PRIVATE ROOM

## 2023-08-10 PROCEDURE — 85461 HEMOGLOBIN FETAL: CPT | Performed by: OBSTETRICS & GYNECOLOGY

## 2023-08-10 PROCEDURE — 63600175 PHARM REV CODE 636 W HCPCS

## 2023-08-10 PROCEDURE — 59514 CESAREAN DELIVERY ONLY: CPT | Mod: QX,CRNA,, | Performed by: NURSE ANESTHETIST, CERTIFIED REGISTERED

## 2023-08-10 PROCEDURE — 59514 PRA REAN DELIVERY ONLY: ICD-10-PCS | Mod: QX,CRNA,, | Performed by: NURSE ANESTHETIST, CERTIFIED REGISTERED

## 2023-08-10 PROCEDURE — 63600175 PHARM REV CODE 636 W HCPCS: Performed by: NURSE ANESTHETIST, CERTIFIED REGISTERED

## 2023-08-10 PROCEDURE — 36004725 HC OB OR TIME LEV III - EA ADD 15 MIN: Performed by: OBSTETRICS & GYNECOLOGY

## 2023-08-10 PROCEDURE — 80053 COMPREHEN METABOLIC PANEL: CPT | Performed by: OBSTETRICS & GYNECOLOGY

## 2023-08-10 PROCEDURE — 27201423 OPTIME MED/SURG SUP & DEVICES STERILE SUPPLY: Performed by: OBSTETRICS & GYNECOLOGY

## 2023-08-10 PROCEDURE — 36004724 HC OB OR TIME LEV III - 1ST 15 MIN: Performed by: OBSTETRICS & GYNECOLOGY

## 2023-08-10 PROCEDURE — 85025 COMPLETE CBC W/AUTO DIFF WBC: CPT | Performed by: OBSTETRICS & GYNECOLOGY

## 2023-08-10 PROCEDURE — 51702 INSERT TEMP BLADDER CATH: CPT

## 2023-08-10 PROCEDURE — 63600175 PHARM REV CODE 636 W HCPCS: Performed by: STUDENT IN AN ORGANIZED HEALTH CARE EDUCATION/TRAINING PROGRAM

## 2023-08-10 PROCEDURE — 84550 ASSAY OF BLOOD/URIC ACID: CPT | Performed by: OBSTETRICS & GYNECOLOGY

## 2023-08-10 PROCEDURE — 37000008 HC ANESTHESIA 1ST 15 MINUTES: Performed by: OBSTETRICS & GYNECOLOGY

## 2023-08-10 RX ORDER — ONDANSETRON 2 MG/ML
INJECTION INTRAMUSCULAR; INTRAVENOUS
Status: DISCONTINUED | OUTPATIENT
Start: 2023-08-10 | End: 2023-08-10

## 2023-08-10 RX ORDER — MORPHINE SULFATE 4 MG/ML
4 INJECTION, SOLUTION INTRAMUSCULAR; INTRAVENOUS
Status: DISCONTINUED | OUTPATIENT
Start: 2023-08-10 | End: 2023-08-13 | Stop reason: HOSPADM

## 2023-08-10 RX ORDER — SIMETHICONE 80 MG
1 TABLET,CHEWABLE ORAL EVERY 6 HOURS PRN
Status: DISCONTINUED | OUTPATIENT
Start: 2023-08-10 | End: 2023-08-13 | Stop reason: HOSPADM

## 2023-08-10 RX ORDER — CALCIUM GLUCONATE 98 MG/ML
INJECTION, SOLUTION INTRAVENOUS
Status: DISPENSED
Start: 2023-08-10 | End: 2023-08-10

## 2023-08-10 RX ORDER — METHYLERGONOVINE MALEATE 0.2 MG/ML
200 INJECTION INTRAVENOUS
Status: DISCONTINUED | OUTPATIENT
Start: 2023-08-10 | End: 2023-08-13 | Stop reason: HOSPADM

## 2023-08-10 RX ORDER — SODIUM CHLORIDE, SODIUM LACTATE, POTASSIUM CHLORIDE, CALCIUM CHLORIDE 600; 310; 30; 20 MG/100ML; MG/100ML; MG/100ML; MG/100ML
INJECTION, SOLUTION INTRAVENOUS CONTINUOUS
Status: DISCONTINUED | OUTPATIENT
Start: 2023-08-10 | End: 2023-08-13 | Stop reason: HOSPADM

## 2023-08-10 RX ORDER — CARBOPROST TROMETHAMINE 250 UG/ML
250 INJECTION, SOLUTION INTRAMUSCULAR
Status: DISCONTINUED | OUTPATIENT
Start: 2023-08-10 | End: 2023-08-13 | Stop reason: HOSPADM

## 2023-08-10 RX ORDER — ONDANSETRON 4 MG/1
8 TABLET, ORALLY DISINTEGRATING ORAL EVERY 8 HOURS PRN
Status: DISCONTINUED | OUTPATIENT
Start: 2023-08-10 | End: 2023-08-13 | Stop reason: HOSPADM

## 2023-08-10 RX ORDER — DIPHENOXYLATE HYDROCHLORIDE AND ATROPINE SULFATE 2.5; .025 MG/1; MG/1
2 TABLET ORAL EVERY 6 HOURS PRN
Status: DISCONTINUED | OUTPATIENT
Start: 2023-08-10 | End: 2023-08-13 | Stop reason: HOSPADM

## 2023-08-10 RX ORDER — KETOROLAC TROMETHAMINE 30 MG/ML
INJECTION, SOLUTION INTRAMUSCULAR; INTRAVENOUS
Status: DISCONTINUED | OUTPATIENT
Start: 2023-08-10 | End: 2023-08-10

## 2023-08-10 RX ORDER — ADHESIVE BANDAGE
30 BANDAGE TOPICAL 2 TIMES DAILY PRN
Status: DISCONTINUED | OUTPATIENT
Start: 2023-08-11 | End: 2023-08-13 | Stop reason: HOSPADM

## 2023-08-10 RX ORDER — AMOXICILLIN 250 MG
1 CAPSULE ORAL NIGHTLY PRN
Status: DISCONTINUED | OUTPATIENT
Start: 2023-08-10 | End: 2023-08-13 | Stop reason: HOSPADM

## 2023-08-10 RX ORDER — PROCHLORPERAZINE EDISYLATE 5 MG/ML
5 INJECTION INTRAMUSCULAR; INTRAVENOUS EVERY 6 HOURS PRN
Status: DISCONTINUED | OUTPATIENT
Start: 2023-08-10 | End: 2023-08-13 | Stop reason: HOSPADM

## 2023-08-10 RX ORDER — ACETAMINOPHEN 10 MG/ML
INJECTION, SOLUTION INTRAVENOUS
Status: DISCONTINUED | OUTPATIENT
Start: 2023-08-10 | End: 2023-08-10

## 2023-08-10 RX ORDER — MISOPROSTOL 100 UG/1
800 TABLET ORAL ONCE AS NEEDED
Status: DISCONTINUED | OUTPATIENT
Start: 2023-08-10 | End: 2023-08-13 | Stop reason: HOSPADM

## 2023-08-10 RX ORDER — MAGNESIUM SULFATE HEPTAHYDRATE 40 MG/ML
INJECTION, SOLUTION INTRAVENOUS
Status: COMPLETED
Start: 2023-08-10 | End: 2023-08-10

## 2023-08-10 RX ORDER — OXYTOCIN/RINGER'S LACTATE 30/500 ML
95 PLASTIC BAG, INJECTION (ML) INTRAVENOUS ONCE AS NEEDED
Status: DISCONTINUED | OUTPATIENT
Start: 2023-08-10 | End: 2023-08-13 | Stop reason: HOSPADM

## 2023-08-10 RX ORDER — MORPHINE SULFATE 0.5 MG/ML
INJECTION, SOLUTION EPIDURAL; INTRATHECAL; INTRAVENOUS
Status: DISCONTINUED | OUTPATIENT
Start: 2023-08-10 | End: 2023-08-10

## 2023-08-10 RX ORDER — SODIUM CHLORIDE 0.9 % (FLUSH) 0.9 %
10 SYRINGE (ML) INJECTION
Status: DISCONTINUED | OUTPATIENT
Start: 2023-08-10 | End: 2023-08-13 | Stop reason: HOSPADM

## 2023-08-10 RX ORDER — OXYCODONE AND ACETAMINOPHEN 5; 325 MG/1; MG/1
1 TABLET ORAL EVERY 4 HOURS PRN
Status: DISCONTINUED | OUTPATIENT
Start: 2023-08-10 | End: 2023-08-13 | Stop reason: HOSPADM

## 2023-08-10 RX ORDER — OXYTOCIN/RINGER'S LACTATE 30/500 ML
95 PLASTIC BAG, INJECTION (ML) INTRAVENOUS ONCE
Status: COMPLETED | OUTPATIENT
Start: 2023-08-10 | End: 2023-08-10

## 2023-08-10 RX ORDER — OXYTOCIN/RINGER'S LACTATE 30/500 ML
334 PLASTIC BAG, INJECTION (ML) INTRAVENOUS ONCE AS NEEDED
Status: DISCONTINUED | OUTPATIENT
Start: 2023-08-10 | End: 2023-08-13 | Stop reason: HOSPADM

## 2023-08-10 RX ORDER — FAMOTIDINE 10 MG/ML
20 INJECTION INTRAVENOUS
Status: DISCONTINUED | OUTPATIENT
Start: 2023-08-10 | End: 2023-08-13 | Stop reason: HOSPADM

## 2023-08-10 RX ORDER — MAGNESIUM SULFATE HEPTAHYDRATE 40 MG/ML
2 INJECTION, SOLUTION INTRAVENOUS ONCE
Status: DISCONTINUED | OUTPATIENT
Start: 2023-08-10 | End: 2023-08-13 | Stop reason: HOSPADM

## 2023-08-10 RX ORDER — CLINDAMYCIN PHOSPHATE 900 MG/50ML
900 INJECTION, SOLUTION INTRAVENOUS
Status: DISCONTINUED | OUTPATIENT
Start: 2023-08-10 | End: 2023-08-10

## 2023-08-10 RX ORDER — PHENYLEPHRINE HYDROCHLORIDE 10 MG/ML
INJECTION INTRAVENOUS
Status: DISCONTINUED | OUTPATIENT
Start: 2023-08-10 | End: 2023-08-10

## 2023-08-10 RX ORDER — OXYTOCIN/RINGER'S LACTATE 30/500 ML
334 PLASTIC BAG, INJECTION (ML) INTRAVENOUS ONCE
Status: DISCONTINUED | OUTPATIENT
Start: 2023-08-10 | End: 2023-08-13 | Stop reason: HOSPADM

## 2023-08-10 RX ORDER — FENTANYL CITRATE 50 UG/ML
INJECTION, SOLUTION INTRAMUSCULAR; INTRAVENOUS
Status: DISCONTINUED | OUTPATIENT
Start: 2023-08-10 | End: 2023-08-10

## 2023-08-10 RX ORDER — MISOPROSTOL 100 UG/1
800 TABLET ORAL
Status: DISCONTINUED | OUTPATIENT
Start: 2023-08-10 | End: 2023-08-13 | Stop reason: HOSPADM

## 2023-08-10 RX ORDER — DOCUSATE SODIUM 100 MG/1
200 CAPSULE, LIQUID FILLED ORAL 2 TIMES DAILY
Status: DISCONTINUED | OUTPATIENT
Start: 2023-08-10 | End: 2023-08-13 | Stop reason: HOSPADM

## 2023-08-10 RX ORDER — BUPIVACAINE HYDROCHLORIDE 7.5 MG/ML
INJECTION, SOLUTION EPIDURAL; RETROBULBAR
Status: COMPLETED | OUTPATIENT
Start: 2023-08-10 | End: 2023-08-10

## 2023-08-10 RX ORDER — CEFAZOLIN SODIUM 2 G/50ML
2 SOLUTION INTRAVENOUS
Status: DISCONTINUED | OUTPATIENT
Start: 2023-08-10 | End: 2023-08-10

## 2023-08-10 RX ORDER — KETOROLAC TROMETHAMINE 30 MG/ML
30 INJECTION, SOLUTION INTRAMUSCULAR; INTRAVENOUS EVERY 8 HOURS
Status: COMPLETED | OUTPATIENT
Start: 2023-08-10 | End: 2023-08-11

## 2023-08-10 RX ORDER — DIPHENHYDRAMINE HCL 25 MG
25 CAPSULE ORAL EVERY 4 HOURS PRN
Status: DISCONTINUED | OUTPATIENT
Start: 2023-08-10 | End: 2023-08-13 | Stop reason: HOSPADM

## 2023-08-10 RX ORDER — OXYTOCIN/RINGER'S LACTATE 30/500 ML
PLASTIC BAG, INJECTION (ML) INTRAVENOUS CONTINUOUS PRN
Status: DISCONTINUED | OUTPATIENT
Start: 2023-08-10 | End: 2023-08-10

## 2023-08-10 RX ORDER — OXYTOCIN/RINGER'S LACTATE 30/500 ML
95 PLASTIC BAG, INJECTION (ML) INTRAVENOUS ONCE
Status: DISCONTINUED | OUTPATIENT
Start: 2023-08-10 | End: 2023-08-13 | Stop reason: HOSPADM

## 2023-08-10 RX ORDER — IBUPROFEN 800 MG/1
800 TABLET ORAL EVERY 8 HOURS
Status: DISCONTINUED | OUTPATIENT
Start: 2023-08-11 | End: 2023-08-13 | Stop reason: HOSPADM

## 2023-08-10 RX ORDER — PRENATAL WITH FERROUS FUM AND FOLIC ACID 3080; 920; 120; 400; 22; 1.84; 3; 20; 10; 1; 12; 200; 27; 25; 2 [IU]/1; [IU]/1; MG/1; [IU]/1; MG/1; MG/1; MG/1; MG/1; MG/1; MG/1; UG/1; MG/1; MG/1; MG/1; MG/1
1 TABLET ORAL DAILY
Status: DISCONTINUED | OUTPATIENT
Start: 2023-08-10 | End: 2023-08-13 | Stop reason: HOSPADM

## 2023-08-10 RX ORDER — SODIUM CITRATE AND CITRIC ACID MONOHYDRATE 334; 500 MG/5ML; MG/5ML
30 SOLUTION ORAL
Status: DISCONTINUED | OUTPATIENT
Start: 2023-08-10 | End: 2023-08-13 | Stop reason: HOSPADM

## 2023-08-10 RX ORDER — BISACODYL 10 MG
10 SUPPOSITORY, RECTAL RECTAL ONCE AS NEEDED
Status: DISCONTINUED | OUTPATIENT
Start: 2023-08-10 | End: 2023-08-13 | Stop reason: HOSPADM

## 2023-08-10 RX ORDER — OXYTOCIN 10 [USP'U]/ML
10 INJECTION, SOLUTION INTRAMUSCULAR; INTRAVENOUS ONCE AS NEEDED
Status: DISCONTINUED | OUTPATIENT
Start: 2023-08-10 | End: 2023-08-13 | Stop reason: HOSPADM

## 2023-08-10 RX ADMIN — SODIUM CHLORIDE, POTASSIUM CHLORIDE, SODIUM LACTATE AND CALCIUM CHLORIDE: 600; 310; 30; 20 INJECTION, SOLUTION INTRAVENOUS at 04:08

## 2023-08-10 RX ADMIN — VANCOMYCIN HYDROCHLORIDE 2000 MG: 10 INJECTION, POWDER, LYOPHILIZED, FOR SOLUTION INTRAVENOUS at 08:08

## 2023-08-10 RX ADMIN — RISPERIDONE 3 MG: 1 TABLET ORAL at 09:08

## 2023-08-10 RX ADMIN — HUMAN RHO(D) IMMUNE GLOBULIN 300 MCG: 1500 SOLUTION INTRAMUSCULAR; INTRAVENOUS at 05:08

## 2023-08-10 RX ADMIN — MORPHINE SULFATE 0.15 MG: 0.5 INJECTION, SOLUTION EPIDURAL; INTRATHECAL; INTRAVENOUS at 08:08

## 2023-08-10 RX ADMIN — DOCUSATE SODIUM 200 MG: 100 CAPSULE, LIQUID FILLED ORAL at 12:08

## 2023-08-10 RX ADMIN — KETOROLAC TROMETHAMINE 30 MG: 30 INJECTION, SOLUTION INTRAMUSCULAR; INTRAVENOUS at 09:08

## 2023-08-10 RX ADMIN — GENTAMICIN SULFATE 324.4 MG: 40 INJECTION, SOLUTION INTRAMUSCULAR; INTRAVENOUS at 12:08

## 2023-08-10 RX ADMIN — ONDANSETRON 4 MG: 2 INJECTION INTRAMUSCULAR; INTRAVENOUS at 08:08

## 2023-08-10 RX ADMIN — PHENYLEPHRINE HYDROCHLORIDE 200 MCG: 10 INJECTION INTRAVENOUS at 08:08

## 2023-08-10 RX ADMIN — ACETAMINOPHEN 1000 MG: 10 INJECTION, SOLUTION INTRAVENOUS at 08:08

## 2023-08-10 RX ADMIN — DOCUSATE SODIUM 200 MG: 100 CAPSULE, LIQUID FILLED ORAL at 09:08

## 2023-08-10 RX ADMIN — PANTOPRAZOLE SODIUM 20 MG: 20 TABLET, DELAYED RELEASE ORAL at 09:08

## 2023-08-10 RX ADMIN — Medication 95 MILLI-UNITS/MIN: at 09:08

## 2023-08-10 RX ADMIN — Medication 250 ML/HR: at 08:08

## 2023-08-10 RX ADMIN — PHENYLEPHRINE HYDROCHLORIDE 100 MCG: 10 INJECTION INTRAVENOUS at 08:08

## 2023-08-10 RX ADMIN — BUSPIRONE HYDROCHLORIDE 15 MG: 5 TABLET ORAL at 09:08

## 2023-08-10 RX ADMIN — SODIUM CHLORIDE, POTASSIUM CHLORIDE, SODIUM LACTATE AND CALCIUM CHLORIDE: 600; 310; 30; 20 INJECTION, SOLUTION INTRAVENOUS at 08:08

## 2023-08-10 RX ADMIN — KETOROLAC TROMETHAMINE 30 MG: 30 INJECTION, SOLUTION INTRAMUSCULAR; INTRAVENOUS at 01:08

## 2023-08-10 RX ADMIN — LEVOTHYROXINE SODIUM 75 MCG: 25 TABLET ORAL at 12:08

## 2023-08-10 RX ADMIN — BUPIVACAINE HYDROCHLORIDE 1.6 ML: 7.5 INJECTION, SOLUTION EPIDURAL; RETROBULBAR at 08:08

## 2023-08-10 RX ADMIN — MAGNESIUM SULFATE IN WATER 40 G: 40 INJECTION, SOLUTION INTRAVENOUS at 10:08

## 2023-08-10 RX ADMIN — FENTANYL CITRATE 10 MCG: 50 INJECTION, SOLUTION INTRAMUSCULAR; INTRAVENOUS at 08:08

## 2023-08-10 NOTE — PROGRESS NOTES
"Antepartum Progress Note        Subjective:      Patient currently  without complaints.  Nonreassuring fetal tracing early am with intermittent late decels . She reports good fetal movement, denies  HA, vis changes .    Objective:      Temp:  [98 °F (36.7 °C)-98.1 °F (36.7 °C)] 98.1 °F (36.7 °C)  Pulse:  [54-98] 57  SpO2:  [97 %-99 %] 98 %  BP: (103-134)/(55-76) 118/59  Body mass index is 40.43 kg/m².     General: no acute distress  Electronic Fetal Monitoring:  FHT: Category: 3  4/8BPP, intermittent decels                 TOCO: Contractions: none   Cervix:deferred     Assessment:     1. IUP at  here for  observation to r/o Pree  2. No results found for: "GROUPTRH"  3. Category 3 FHT with 4/8 BPP this am  Plan:     1. Recommend LTCS at this time for NRFHT remote from delivery  2. Details, r/b/a have been reviewed with pt consents signed  3. Will plan for PP MgSO4      "

## 2023-08-10 NOTE — ANESTHESIA PREPROCEDURE EVALUATION
08/10/2023  Antonella Ackerman is a 23 y.o., female.    Urgent per obstetrician, but states time for spinal    Pre-op Assessment    I have reviewed the Patient Summary Reports.     I have reviewed the Nursing Notes. I have reviewed the NPO Status.   I have reviewed the Medications.     Review of Systems  Anesthesia Hx:   Denies Personal Hx of Anesthesia complications.   Cardiovascular:  Cardiovascular Normal     Pulmonary:  Pulmonary Normal    Hepatic/GI:  Hepatic/GI Normal    Neurological:  Neurology Normal    Endocrine:   Hypothyroidism  Morbid Obesity / BMI > 40  Psych:   Psychiatric History          Physical Exam  General: Well nourished, Cooperative and Alert    Airway:  Mallampati: II   Mouth Opening: Normal  TM Distance: Normal          Anesthesia Plan  Type of Anesthesia, risks & benefits discussed:    Anesthesia Type: Spinal  Intra-op Monitoring Plan: Standard ASA Monitors  Post Op Pain Control Plan: intrathecal opioid  Informed Consent: Informed consent signed with the Patient and all parties understand the risks and agree with anesthesia plan.  All questions answered. Patient consented to blood products? Yes  ASA Score: 2  Day of Surgery Review of History & Physical: H&P Update referred to the surgeon/provider.    Ready For Surgery From Anesthesia Perspective.     .

## 2023-08-10 NOTE — L&D DELIVERY NOTE
[facility] - Labor and Delivery   Section   Operative Note    SUMMARY     Date of Procedure: 8/10/2023     Procedure: Procedure(s) (LRB):   SECTION (N/A)    Surgeon(s) and Role:     * Ale Syed MD - Primary    Assisting Surgeon: None    Pre-Operative Diagnosis: Fetal heart rate non-reassuring affecting management of mother [O36.8390]    Post-Operative Diagnosis: Post-Op Diagnosis Codes:     * Fetal heart rate non-reassuring affecting management of mother [O36.8390]    Anesthesia: Spinal/Epidural    Technical Procedures Used:            Description of the Findings of the Procedure: viable male 6+4 lbs    Significant Surgical Tasks Conducted by the Assistant(s), if Applicable:     Complications: No    Blood Loss: * No values recorded between 8/10/2023  8:46 AM and 8/10/2023  9:09 AM *     With patient in supine position, the legs are  and Posada Catheter placed and positioning to supine done.   Abdomen prepped with Chloroprep and 3 minute drying time allowed prior to draping of the abdomen.   Time out taken with OR team members.  Pfannenstiel Incision made through the skin, transverse fascial incision developed, rectus muscles  in the midline and the peritoneum entered.   no adhesions noted.  The lower uterine segment and position of the fetus identified.   Bladder flap taken down through transverse peritoneal incision.    Low Transverse Incision made through well developer lower uterine segment and extended laterally with blunt dissection.   Clear fluid noted.  Infant delivered from vertex presentation.  Cord clamped after one minute and  handed to attending nurse.  Cord blood taken, placenta delivered.  The uterus wasnot exteriorized.  The edges of the uterine incision are grasped with Evans clamps at the angles and the inferior and superior midline edges of the incision.    Closure with running lock 0 Chromic, starting at each angle, tying in  the midline.   Observation for bleeding with suture of any bleeding along the hysterotomy line.   With good hemostasis noted, the anterior pelvis is rinsed with sterile saline.   Right and left adnexa with normal anatomy.     Closure of the abdomen with 2 0 Vicryl running of the peritoneum, fascial closure with 0 vicryl starting at the angles and tying the knot at the midline. Subcutaneous tissue closed with plain gut.   Skin closure with 4 0 monocryl subcuticular.  Wound dressed with pressure dressing.          Specimens:   Specimen (24h ago, onward)      None            Condition: Good    VTE Risk Mitigation (From admission, onward)           Ordered     IP VTE HIGH RISK PATIENT  Once         08/10/23 0810     Place sequential compression device  Until discontinued         08/10/23 0810                    Disposition: PACU - hemodynamically stable.    Attestation: Good         Delivery Information for Maciej Ackerman    Birth information:  YOB: 2021   Time of birth: 8:50 AM   Sex: male   Head Delivery Date/Time: 8/10/2023  8:50 AM   Delivery type: , Low Transverse   Gestational Age: 36w5d        Delivery Providers    Delivering clinician: Ale Syed MD           Measurements    Weight:   Length:          Apgars    Living status:   Apgar Component Scores:  1 min.:  5 min.:  10 min.:  15 min.:  20 min.:    Skin color:         Heart rate:         Reflex irritability:         Muscle tone:         Respiratory effort:         Total:                  Operative Delivery    Forceps attempted?: No  Vacuum extractor attempted?: No         Shoulder Dystocia    Shoulder dystocia present?: No           Presentation    Presentation: Vertex  Position: Middle Occiput           Interventions/Resuscitation           Cord    Vessels: 3 vessels  Complications: Nuchal  Nuchal Intervention: reduced  Nuchal Cord Description: loose nuchal cord  Number of Loops: 1  Delayed Cord Clamping?:  No  Cord Clamped Date/Time: 8/10/2023  8:50 AM  Cord Blood Disposition: Sent with Baby  Gases Sent?: No  Stem Cell Collection (by MD): No       Placenta    Placenta delivery date/time: 8/10/2023 0851  Placenta removal: Manual removal  Placenta appearance: Intact  Placenta disposition: Discarded           Labor Events:       labor:       Labor Onset Date/Time:         Dilation Complete Date/Time:         Start Pushing Date/Time:       Rupture Date/Time: 08/10/23  0850         Rupture type: ARM (Artificial Rupture)         Fluid Amount:       Fluid Color: Clear       steroids: Partial Course     Antibiotics given for GBS: No     Induction:       Indications for induction:        Augmentation:       Indications for augmentation:       Labor complications: Fetal Intolerance     Additional complications:          Cervical ripening:                     Delivery:      Episiotomy:       Indication for Episiotomy:       Perineal Lacerations:   Repaired:      Periurethral Laceration:   Repaired:     Labial Laceration:   Repaired:     Sulcus Laceration:   Repaired:     Vaginal Laceration:   Repaired:     Cervical Laceration:   Repaired:     Repair suture:       Repair # of packets:       Last Value - EBL - Nursing (mL):       Sum - EBL - Nursing (mL): 0     Last Value - EBL - Anesthesia (mL):      Calculated QBL (mL):       Vaginal Sweep Performed:       Surgicount Correct:         Other providers:       Anesthesia    Method: Spinal          Details (if applicable):  Trial of Labor No    Categorization: Primary    Priority: Routine   Indications for : Fetal heart rate abnormalities   Incision Type: low transverse     Additional  information:  Forceps:    Vacuum:    Breech:    Observed anomalies    Other (Comments):

## 2023-08-10 NOTE — H&P
"Antepartum H&P        Subjective:      Patient admitted for observation due to abnormal lab results in office. She reports good fetal movement, denies  HA, vis changes . Notes BP's have been in normal range at home but 'elevated for her'    Objective:      Pulse:  [54-98] 68  SpO2:  [97 %-99 %] 98 %  BP: (122-134)/(70-76) 122/70  Body mass index is 40.43 kg/m².     General: no acute distress  Electronic Fetal Monitoring:  FHT: Category: 1                   TOCO: Contractions: none   Cervix:deferred     Assessment:     1. IUP at  here for  observation due to abnormal labs noted in office .  Had 24 hr urine and pree labs for mild elevation in BP at recent office visit  2. No results found for: "GROUPTRH"  3. Category 1 FHT  4. BP's normal and pt currently without symptoms  5. 24 hr urine protein normal  6. Platelets low--      Plan:     1. Continue obsesrvation with plan for serial BP's and  repeat labs in am  2. BMZ x 2  ( first dose today)  3. Reassuring FHT  4. Appreciate Dr Dhillon consult        "

## 2023-08-10 NOTE — ANESTHESIA PROCEDURE NOTES
Spinal    Diagnosis: Intrauterine Pregnancy  Patient location during procedure: OB  Start time: 8/10/2023 8:23 AM  Timeout: 8/10/2023 8:23 AM  End time: 8/10/2023 8:27 AM    Staffing  Authorizing Provider: Pete Kapoor MD  Performing Provider: Pete Kapoor MD    Staffing  Performed by: Pete Kapoor MD  Authorized by: Pete Kapoor MD    Preanesthetic Checklist  Completed: patient identified, IV checked, site marked, risks and benefits discussed, surgical consent, monitors and equipment checked, pre-op evaluation and timeout performed  Spinal Block  Patient position: sitting  Prep: ChloraPrep  Patient monitoring: continuous pulse ox and heart rate  Approach: midline  Location: L4-5  Injection technique: single shot  CSF Fluid: clear free-flowing CSF  Needle  Needle type: pencil-tip   Needle gauge: 25 G  Needle length: 3.5 in  Additional Documentation: incremental injection, negative aspiration for heme and no paresthesia on injection  Needle localization: anatomical landmarks  Assessment  Sensory level: T5   Dermatomal levels determined by alcohol wipe  Ease of block: easy  Patient's tolerance of the procedure: comfortable throughout block and no complaints  Additional Notes  With 100mcg Duramorph and 10mcg Fentanyl     Straightforward spinal, two levels, successful at more caudad level, no paresthesias, + csf pre and post injection  Medications:    Medications: bupivacaine (pf) (MARCAINE) injection 0.75% - Intraspinal   1.6 mL - 8/10/2023 8:27:00 AM

## 2023-08-10 NOTE — NURSING
RN completed med rec with patient. Patient took the following home medications at 2245 8/9/23 because of issues with medication times with pharmacy: buspirone 15 mg PO, melatonin 15 mg PO, pantoprazole 20 mg tablet, risperidone 3 mg PO.

## 2023-08-10 NOTE — TRANSFER OF CARE
Anesthesia Transfer of Care Note    Patient: Antonella Ackerman    Procedure(s) Performed: Procedure(s) (LRB):   SECTION (N/A)    Patient location: Labor and Delivery    Anesthesia Type: spinal    Transport from OR: Transported from OR on room air with adequate spontaneous ventilation    Post pain: adequate analgesia    Post assessment: no apparent anesthetic complications and tolerated procedure well    Post vital signs: stable    Level of consciousness: awake, alert and oriented    Nausea/Vomiting: no nausea/vomiting    Complications: none    Transfer of care protocol was followed      Last vitals:   Visit Vitals  /61   Pulse 71   Temp 36.8 °C (98.2 °F) (Oral)   Resp 18   Ht 5' (1.524 m)   Wt 93.9 kg (207 lb)   SpO2 (!) 94%   Breastfeeding No   BMI 40.43 kg/m²

## 2023-08-11 LAB
BASOPHILS # BLD AUTO: 0.01 X10(3)/MCL
BASOPHILS NFR BLD AUTO: 0.2 %
EOSINOPHIL # BLD AUTO: 0.01 X10(3)/MCL (ref 0–0.9)
EOSINOPHIL NFR BLD AUTO: 0.2 %
ERYTHROCYTE [DISTWIDTH] IN BLOOD BY AUTOMATED COUNT: 12.4 % (ref 11.5–17)
HCT VFR BLD AUTO: 28.6 % (ref 37–47)
HGB BLD-MCNC: 9.9 G/DL (ref 12–16)
IMM GRANULOCYTES # BLD AUTO: 0.08 X10(3)/MCL (ref 0–0.04)
IMM GRANULOCYTES NFR BLD AUTO: 1.5 %
LYMPHOCYTES # BLD AUTO: 1.04 X10(3)/MCL (ref 0.6–4.6)
LYMPHOCYTES NFR BLD AUTO: 19.6 %
MCH RBC QN AUTO: 29.7 PG (ref 27–31)
MCHC RBC AUTO-ENTMCNC: 34.6 G/DL (ref 33–36)
MCV RBC AUTO: 85.9 FL (ref 80–94)
MONOCYTES # BLD AUTO: 0.36 X10(3)/MCL (ref 0.1–1.3)
MONOCYTES NFR BLD AUTO: 6.8 %
NEUTROPHILS # BLD AUTO: 3.81 X10(3)/MCL (ref 2.1–9.2)
NEUTROPHILS NFR BLD AUTO: 71.7 %
NRBC BLD AUTO-RTO: 0 %
PLATELET # BLD AUTO: 76 X10(3)/MCL (ref 130–400)
PMV BLD AUTO: 12.9 FL (ref 7.4–10.4)
RBC # BLD AUTO: 3.33 X10(6)/MCL (ref 4.2–5.4)
WBC # SPEC AUTO: 5.31 X10(3)/MCL (ref 4.5–11.5)

## 2023-08-11 PROCEDURE — 99233 PR SUBSEQUENT HOSPITAL CARE,LEVL III: ICD-10-PCS | Mod: ,,, | Performed by: OBSTETRICS & GYNECOLOGY

## 2023-08-11 PROCEDURE — 11000001 HC ACUTE MED/SURG PRIVATE ROOM

## 2023-08-11 PROCEDURE — 85025 COMPLETE CBC W/AUTO DIFF WBC: CPT | Performed by: OBSTETRICS & GYNECOLOGY

## 2023-08-11 PROCEDURE — 25000003 PHARM REV CODE 250: Performed by: OBSTETRICS & GYNECOLOGY

## 2023-08-11 PROCEDURE — 63600175 PHARM REV CODE 636 W HCPCS

## 2023-08-11 PROCEDURE — 63600175 PHARM REV CODE 636 W HCPCS: Performed by: OBSTETRICS & GYNECOLOGY

## 2023-08-11 PROCEDURE — 99233 SBSQ HOSP IP/OBS HIGH 50: CPT | Mod: ,,, | Performed by: OBSTETRICS & GYNECOLOGY

## 2023-08-11 RX ORDER — MAGNESIUM SULFATE HEPTAHYDRATE 40 MG/ML
INJECTION, SOLUTION INTRAVENOUS
Status: COMPLETED
Start: 2023-08-11 | End: 2023-08-11

## 2023-08-11 RX ORDER — FUROSEMIDE 10 MG/ML
20 INJECTION INTRAMUSCULAR; INTRAVENOUS ONCE
Status: COMPLETED | OUTPATIENT
Start: 2023-08-11 | End: 2023-08-11

## 2023-08-11 RX ADMIN — PRENATAL VITAMINS-IRON FUMARATE 27 MG IRON-FOLIC ACID 0.8 MG TABLET 1 TABLET: at 08:08

## 2023-08-11 RX ADMIN — KETOROLAC TROMETHAMINE 30 MG: 30 INJECTION, SOLUTION INTRAMUSCULAR; INTRAVENOUS at 05:08

## 2023-08-11 RX ADMIN — PANTOPRAZOLE SODIUM 20 MG: 20 TABLET, DELAYED RELEASE ORAL at 10:08

## 2023-08-11 RX ADMIN — BUSPIRONE HYDROCHLORIDE 15 MG: 5 TABLET ORAL at 10:08

## 2023-08-11 RX ADMIN — DOCUSATE SODIUM 200 MG: 100 CAPSULE, LIQUID FILLED ORAL at 08:08

## 2023-08-11 RX ADMIN — SODIUM CHLORIDE, POTASSIUM CHLORIDE, SODIUM LACTATE AND CALCIUM CHLORIDE: 600; 310; 30; 20 INJECTION, SOLUTION INTRAVENOUS at 05:08

## 2023-08-11 RX ADMIN — DOCUSATE SODIUM 200 MG: 100 CAPSULE, LIQUID FILLED ORAL at 10:08

## 2023-08-11 RX ADMIN — IBUPROFEN 800 MG: 800 TABLET, FILM COATED ORAL at 01:08

## 2023-08-11 RX ADMIN — RISPERIDONE 3 MG: 1 TABLET ORAL at 10:08

## 2023-08-11 RX ADMIN — FUROSEMIDE 20 MG: 10 INJECTION, SOLUTION INTRAMUSCULAR; INTRAVENOUS at 10:08

## 2023-08-11 RX ADMIN — MAGNESIUM SULFATE IN WATER 2 G/HR: 40 INJECTION, SOLUTION INTRAVENOUS at 05:08

## 2023-08-11 RX ADMIN — IBUPROFEN 800 MG: 800 TABLET, FILM COATED ORAL at 10:08

## 2023-08-11 RX ADMIN — RISPERIDONE 1.5 MG: 1 TABLET ORAL at 09:08

## 2023-08-11 RX ADMIN — LEVOTHYROXINE SODIUM 75 MCG: 25 TABLET ORAL at 08:08

## 2023-08-11 NOTE — ANESTHESIA POSTPROCEDURE EVALUATION
Anesthesia Post Evaluation    Patient: Antonella Ackerman    Procedure(s) Performed: Procedure(s) (LRB):   SECTION (N/A)    Final Anesthesia Type: epidural      Patient location during evaluation: labor & delivery  Patient participation: Yes- Able to Participate  Post-procedure vital signs: reviewed and stable (No complaints at this time)  Pain management: adequate      Anesthetic complications: no          No complaints at this time.        Vitals Value Taken Time   /74 23 1123   Temp 36.8 °C (98.2 °F) 23 0800   Pulse 87 23 1154   Resp 16 23 1000   SpO2 97 % 23 1153   Vitals shown include unvalidated device data.      Event Time   Out of Recovery 08/10/2023 10:15:00         Pain/Ida Score: Pain Rating Prior to Med Admin: 3 (2023  5:56 AM)  Ida Score: 9 (8/10/2023 10:15 AM)

## 2023-08-11 NOTE — PROGRESS NOTES
Progress Note  Maternal Fetal Medicine          Subjective:         Antonella Ackerman is a 23 y.o.  s/p C/S at 36w5d due to pre-eclampsia with severe features, POD1.       She is doing well this AM and feeling better since magnesium stopped. She says she has some SOA and has 2L NC on now. She denies pain, bleeding controlled, anderson is out.  Lab review- platelet count still remains low. No other significant issues.       Objective:         Temp:  [97.9 °F (36.6 °C)-98.3 °F (36.8 °C)] 98.2 °F (36.8 °C)  Pulse:  [50-86] 86  Resp:  [16-18] 16  SpO2:  [88 %-97 %] 95 %  BP: ()/(52-77) 132/75    Gen: NAD, A&Ox3  Pulm: Unlabored, 2L NC, bilateral crackles at bases  Card: RRR  Abd: FHT present, soft, nondistended, nontender to palpation, gravid uterus palpable c/w gestational age  Extremities: Palpable peripheral pulses, 2+ pitting edema bilat LE, 2+-3+ DTRs x 4    Lab Review  Blood Type: O NEG      Recent Results (from the past 24 hour(s))   ABORH RETYPE    Collection Time: 08/10/23 10:37 AM   Result Value Ref Range    ABORH Retype O NEG    Prepare Rh Immune Globulin    Collection Time: 08/10/23 11:17 AM   Result Value Ref Range    Rh Immune Glogulin [id number] Issued    Comprehensive Metabolic Panel    Collection Time: 08/10/23  3:56 PM   Result Value Ref Range    Sodium Level 137 136 - 145 mmol/L    Potassium Level 4.0 3.5 - 5.1 mmol/L    Chloride 107 98 - 107 mmol/L    Carbon Dioxide 22 22 - 29 mmol/L    Glucose Level 139 (H) 74 - 100 mg/dL    Blood Urea Nitrogen 8.4 7.0 - 18.7 mg/dL    Creatinine 0.69 0.55 - 1.02 mg/dL    Calcium Level Total 7.7 (L) 8.4 - 10.2 mg/dL    Protein Total 4.5 (L) 6.4 - 8.3 gm/dL    Albumin Level 2.2 (L) 3.5 - 5.0 g/dL    Globulin 2.3 (L) 2.4 - 3.5 gm/dL    Albumin/Globulin Ratio 1.0 (L) 1.1 - 2.0 ratio    Bilirubin Total 0.2 <=1.5 mg/dL    Alkaline Phosphatase 104 40 - 150 unit/L    Alanine Aminotransferase 16 0 - 55 unit/L    Aspartate Aminotransferase 24 5 - 34 unit/L     eGFR >60 mls/min/1.73/m2   CBC with Differential    Collection Time: 08/10/23  3:56 PM   Result Value Ref Range    WBC 8.40 4.50 - 11.50 x10(3)/mcL    RBC 3.47 (L) 4.20 - 5.40 x10(6)/mcL    Hgb 10.4 (L) 12.0 - 16.0 g/dL    Hct 29.7 (L) 37.0 - 47.0 %    MCV 85.6 80.0 - 94.0 fL    MCH 30.0 27.0 - 31.0 pg    MCHC 35.0 33.0 - 36.0 g/dL    RDW 12.3 11.5 - 17.0 %    Platelet 77 (L) 130 - 400 x10(3)/mcL    MPV 13.1 (H) 7.4 - 10.4 fL    Neut % 80.8 %    Lymph % 12.5 %    Mono % 5.8 %    Eos % 0.0 %    Basophil % 0.1 %    Lymph # 1.05 0.6 - 4.6 x10(3)/mcL    Neut # 6.78 2.1 - 9.2 x10(3)/mcL    Mono # 0.49 0.1 - 1.3 x10(3)/mcL    Eos # 0.00 0 - 0.9 x10(3)/mcL    Baso # 0.01 <=0.2 x10(3)/mcL    IG# 0.07 (H) 0 - 0.04 x10(3)/mcL    IG% 0.8 %    NRBC% 0.0 %   Magnesium    Collection Time: 08/10/23  3:56 PM   Result Value Ref Range    Magnesium Level 4.70 (H) 1.60 - 2.60 mg/dL   CBC with Differential    Collection Time: 23  5:28 AM   Result Value Ref Range    WBC 5.31 4.50 - 11.50 x10(3)/mcL    RBC 3.33 (L) 4.20 - 5.40 x10(6)/mcL    Hgb 9.9 (L) 12.0 - 16.0 g/dL    Hct 28.6 (L) 37.0 - 47.0 %    MCV 85.9 80.0 - 94.0 fL    MCH 29.7 27.0 - 31.0 pg    MCHC 34.6 33.0 - 36.0 g/dL    RDW 12.4 11.5 - 17.0 %    Platelet 76 (L) 130 - 400 x10(3)/mcL    MPV 12.9 (H) 7.4 - 10.4 fL    Neut % 71.7 %    Lymph % 19.6 %    Mono % 6.8 %    Eos % 0.2 %    Basophil % 0.2 %    Lymph # 1.04 0.6 - 4.6 x10(3)/mcL    Neut # 3.81 2.1 - 9.2 x10(3)/mcL    Mono # 0.36 0.1 - 1.3 x10(3)/mcL    Eos # 0.01 0 - 0.9 x10(3)/mcL    Baso # 0.01 <=0.2 x10(3)/mcL    IG# 0.08 (H) 0 - 0.04 x10(3)/mcL    IG% 1.5 %    NRBC% 0.0 %       Assessment:       23 y.o.  s/p C/S at 36w5d due to pre-eclampsia with severe features, POD1.     Active Hospital Problems    Diagnosis  POA    *Elevated blood pressure affecting pregnancy, antepartum [O16.9]  Unknown      Resolved Hospital Problems   No resolved problems to display.        Plan:     1. Pre-eclampsia  - Delivery  occurred at 36w5d due to thrombocytopenia, HA, fetal status change  - S/p 24 hrs PP magnesium  - Continues to have thrombocytopenia- trend labs daily  - BP normotensive at this time.  - If thrombocytopenia improves >100k, add lovenox ppx (40mg BID) while inpatient only    2. Pulmonary edema  - Crackles on exam. 20mg IV lasix ordered  - Wean O2 as tolerates    3. Hypothyroidism (POA), Anxiety (POA), bipolar (POA)  - Continue home meds for all    Thank you for allowing us to participate in the care of your patient. If you have any questions/concerns, please do not hesitate to contact us.       Yamila Dhillon  Maternal Fetal Medicine

## 2023-08-12 LAB
ALBUMIN SERPL-MCNC: 2.3 G/DL (ref 3.5–5)
ALBUMIN/GLOB SERPL: 0.9 RATIO (ref 1.1–2)
ALP SERPL-CCNC: 98 UNIT/L (ref 40–150)
ALT SERPL-CCNC: 15 UNIT/L (ref 0–55)
AST SERPL-CCNC: 18 UNIT/L (ref 5–34)
BILIRUB SERPL-MCNC: 0.2 MG/DL
BUN SERPL-MCNC: 6.2 MG/DL (ref 7–18.7)
CALCIUM SERPL-MCNC: 8.4 MG/DL (ref 8.4–10.2)
CHLORIDE SERPL-SCNC: 106 MMOL/L (ref 98–107)
CO2 SERPL-SCNC: 26 MMOL/L (ref 22–29)
CREAT SERPL-MCNC: 0.69 MG/DL (ref 0.55–1.02)
ERYTHROCYTE [DISTWIDTH] IN BLOOD BY AUTOMATED COUNT: 12.6 % (ref 11.5–17)
GFR SERPLBLD CREATININE-BSD FMLA CKD-EPI: >60 MLS/MIN/1.73/M2
GLOBULIN SER-MCNC: 2.5 GM/DL (ref 2.4–3.5)
GLUCOSE SERPL-MCNC: 77 MG/DL (ref 74–100)
HCT VFR BLD AUTO: 28 % (ref 37–47)
HGB BLD-MCNC: 9.8 G/DL (ref 12–16)
MCH RBC QN AUTO: 30.5 PG (ref 27–31)
MCHC RBC AUTO-ENTMCNC: 35 G/DL (ref 33–36)
MCV RBC AUTO: 87.2 FL (ref 80–94)
NRBC BLD AUTO-RTO: 0 %
PLATELET # BLD AUTO: 96 X10(3)/MCL (ref 130–400)
PMV BLD AUTO: 12.4 FL (ref 7.4–10.4)
POTASSIUM SERPL-SCNC: 3.6 MMOL/L (ref 3.5–5.1)
PROT SERPL-MCNC: 4.8 GM/DL (ref 6.4–8.3)
RBC # BLD AUTO: 3.21 X10(6)/MCL (ref 4.2–5.4)
SODIUM SERPL-SCNC: 142 MMOL/L (ref 136–145)
WBC # SPEC AUTO: 6.11 X10(3)/MCL (ref 4.5–11.5)

## 2023-08-12 PROCEDURE — 85027 COMPLETE CBC AUTOMATED: CPT | Performed by: OBSTETRICS & GYNECOLOGY

## 2023-08-12 PROCEDURE — 99223 1ST HOSP IP/OBS HIGH 75: CPT | Mod: ,,, | Performed by: OBSTETRICS & GYNECOLOGY

## 2023-08-12 PROCEDURE — 99223 PR INITIAL HOSPITAL CARE,LEVL III: ICD-10-PCS | Mod: ,,, | Performed by: OBSTETRICS & GYNECOLOGY

## 2023-08-12 PROCEDURE — 11000001 HC ACUTE MED/SURG PRIVATE ROOM

## 2023-08-12 PROCEDURE — 80053 COMPREHEN METABOLIC PANEL: CPT | Performed by: OBSTETRICS & GYNECOLOGY

## 2023-08-12 PROCEDURE — 25000003 PHARM REV CODE 250: Performed by: OBSTETRICS & GYNECOLOGY

## 2023-08-12 RX ADMIN — BUSPIRONE HYDROCHLORIDE 15 MG: 5 TABLET ORAL at 10:08

## 2023-08-12 RX ADMIN — RISPERIDONE 3 MG: 1 TABLET ORAL at 11:08

## 2023-08-12 RX ADMIN — DOCUSATE SODIUM 200 MG: 100 CAPSULE, LIQUID FILLED ORAL at 07:08

## 2023-08-12 RX ADMIN — IBUPROFEN 800 MG: 800 TABLET, FILM COATED ORAL at 06:08

## 2023-08-12 RX ADMIN — PRENATAL VITAMINS-IRON FUMARATE 27 MG IRON-FOLIC ACID 0.8 MG TABLET 1 TABLET: at 07:08

## 2023-08-12 RX ADMIN — RISPERIDONE 3 MG: 1 TABLET ORAL at 07:08

## 2023-08-12 RX ADMIN — PANTOPRAZOLE SODIUM 20 MG: 20 TABLET, DELAYED RELEASE ORAL at 11:08

## 2023-08-12 RX ADMIN — IBUPROFEN 800 MG: 800 TABLET, FILM COATED ORAL at 10:08

## 2023-08-12 RX ADMIN — IBUPROFEN 800 MG: 800 TABLET, FILM COATED ORAL at 01:08

## 2023-08-12 RX ADMIN — LEVOTHYROXINE SODIUM 75 MCG: 25 TABLET ORAL at 07:08

## 2023-08-12 RX ADMIN — SIMETHICONE 80 MG: 80 TABLET, CHEWABLE ORAL at 02:08

## 2023-08-12 NOTE — NURSING
Leobardo called by nancy Gonzalez on 8/12/23 @ 0830 and they said they would probably not coming to see patient until 8/14/23.

## 2023-08-12 NOTE — PROGRESS NOTES
Progress Note  Maternal Fetal Medicine          Subjective:         Antonella Ackerman is a 23 y.o.  s/p C/S at 36w5d due to pre-eclampsia with severe features, POD2.     She is feeling well and has no current complaints. Postpartum course is routine. Lab s have not been drawn yet today (reassess platelets). Bps doing well. No further SOA.       Objective:         Temp:  [97.7 °F (36.5 °C)-99.1 °F (37.3 °C)] 97.7 °F (36.5 °C)  Pulse:  [71-84] 75  Resp:  [16-18] 18  SpO2:  [94 %-100 %] 96 %  BP: (125-142)/(66-84) 129/82    Gen: NAD, A&Ox3  Pulm: Unlabored, CTA  Card: RRR  Abd: FHT present, soft, nondistended, incision not examined  Extremities: Palpable peripheral pulses, 1+ edema    Lab Review  Blood Type: O NEG      No results found for this or any previous visit (from the past 24 hour(s)).      Assessment:       23 y.o.  s/p C/S at 36w5d due to pre-eclampsia with severe features, POD2.     Active Hospital Problems    Diagnosis  POA    *Elevated blood pressure affecting pregnancy, antepartum [O16.9]  Unknown      Resolved Hospital Problems   No resolved problems to display.        Plan:     1. Pre-eclampsia  - Delivery occurred at 36w5d due to thrombocytopenia, HA, fetal status change  - S/p 24 hrs PP magnesium  - Continues to have thrombocytopenia- trend labs daily  - BP normotensive at this time.  - If thrombocytopenia improves >100k, add lovenox ppx (40mg BID) while inpatient only    2. Pulmonary edema  - Crackles on exam. S/p IV lasix dose   - Wean O2 as tolerates, on room air now    3. Hypothyroidism (POA), Anxiety (POA), bipolar (POA)  - Continue home meds for all    Thank you for allowing us to participate in the care of your patient. If you have any questions/concerns, please do not hesitate to contact us.       Yamila Dhillon  Maternal Fetal Medicine

## 2023-08-12 NOTE — PROGRESS NOTES
Delivery Progress Note  Obstetrics      SUBJECTIVE:     Postpartum Day 2:  Delivery secondary to pre-eclampsia with severe features.     Ms. Ackerman states she feels well. Her pain is well controlled with current medications. The patient is ambulating well. Ms. Ackerman is tolerating a normal diet. Flatus has been passed. Urinary output is adequate.  She has not showered yet, waiting for her mother to bring her toiletries from home. She reports improvement in breathing and is currently off supplemental oxygen. Bps are stable.     OBJECTIVE:     Vital Signs (Most Recent):  Temp: 98.3 °F (36.8 °C) (23 1559)  Pulse: 82 (23 1559)  Resp: 18 (23 1559)  BP: 138/84 (23 1559)  SpO2: 96 % (23 1206)    Vital Signs Range (Last 24H):  Temp:  [97.7 °F (36.5 °C)-99.1 °F (37.3 °C)]   Pulse:  [71-85]   Resp:  [18]   BP: (129-142)/(73-84)   SpO2:  [94 %-96 %]     I & O (Last 24H):No intake or output data in the 24 hours ending 23 1741  Physical Exam:  General:    No distress, alert and oriented   Breasts:  Not examined   Abdomen:  Soft, normal bowel sounds, appropriately tender   Fundus:  Firm, below umbilicus   Incision:  Intact, no erythema or drainage   Lochia:   Rubra, minimal   Lower ext:  No calf tenderness     Hemoglobin/Hematocrit  Recent Labs   Lab 23  0959   HGB 9.8*   HCT 28.0*         ASSESSMENT/PLAN:     Status post  section POD#2, Pre-eclampsia with severe features    1. Pre-eclampsia  - Delivery occurred at 36w5d due to thrombocytopenia, HA, fetal status change  - S/p 24 hrs PP magnesium  - Continues to have thrombocytopenia- trend labs daily  - BP normotensive at this time.  - If thrombocytopenia improves >100k, add lovenox ppx (40mg BID) while inpatient only  - Platelets increased this morning, 96. Hold Lovenox for now. Repeat labs in am. CMP, CBC ordered for am.     2. Pulmonary edema  - Clear exam. S/p IV lasix dose   - Off supplemental oxygen      3. Hypothyroidism (POA), Anxiety (POA), bipolar (POA)  - Continue home meds for all    Continue routine postpartum care  Encourage ambulation  Appreciate MFM recommendations

## 2023-08-12 NOTE — PLAN OF CARE
"  Problem: Adult Inpatient Plan of Care  Goal: Plan of Care Review  Outcome: Ongoing, Progressing  Goal: Patient-Specific Goal (Individualized)  Outcome: Ongoing, Progressing  Flowsheets (Taken 8/11/2023 7709)  Individualized Care Needs: "I want to hold my baby."  Goal: Absence of Hospital-Acquired Illness or Injury  Outcome: Ongoing, Progressing  Goal: Optimal Comfort and Wellbeing  Outcome: Ongoing, Progressing  Goal: Readiness for Transition of Care  Outcome: Ongoing, Progressing     "

## 2023-08-12 NOTE — PROGRESS NOTES
"Called to room to assess EPDS 12 and pt answered hardly ever to #10 and which prompted RN to question about suicidal thoughts and pt admits to having them in pregnancy as early as last week. Asked RN to get PEC paperwork together and came to eval.    Pt has very much good insight on her bipolar disease. She has had good prenatal care and currently no SI/HI. Says she would not harm her self or baby and currently feels "the best she has felt in many months." She said she was just being honest on the questionnaire. She has good relationship with her therapist and says she has been talking with them about these thoughts every week. She also has psychiatrist she plans to see next week to get back on her medications. She has good support as FOB in room caring for baby. She denies any hallucinations or feelings of depression currently. She tells me that she has dealt with her mental health diagnosis for many years and feels very "stable and good".     Currently no indication for PEC as pt is not having any issue for emergency treatment. Has good insight and plan to obtain treatment next week with her primary psychiatrist who does not round in this hospital.    Emily Zaman MD  OB/GYN Hospitalist  8/11/2023  9:00 PM    "

## 2023-08-13 VITALS
SYSTOLIC BLOOD PRESSURE: 129 MMHG | BODY MASS INDEX: 40.64 KG/M2 | TEMPERATURE: 98 F | DIASTOLIC BLOOD PRESSURE: 84 MMHG | RESPIRATION RATE: 18 BRPM | HEIGHT: 60 IN | HEART RATE: 84 BPM | OXYGEN SATURATION: 96 % | WEIGHT: 207 LBS

## 2023-08-13 LAB
ALBUMIN SERPL-MCNC: 2.2 G/DL (ref 3.5–5)
ALBUMIN/GLOB SERPL: 0.9 RATIO (ref 1.1–2)
ALP SERPL-CCNC: 90 UNIT/L (ref 40–150)
ALT SERPL-CCNC: 18 UNIT/L (ref 0–55)
AST SERPL-CCNC: 25 UNIT/L (ref 5–34)
BILIRUB SERPL-MCNC: 0.3 MG/DL
BUN SERPL-MCNC: 10 MG/DL (ref 7–18.7)
CALCIUM SERPL-MCNC: 8.3 MG/DL (ref 8.4–10.2)
CHLORIDE SERPL-SCNC: 107 MMOL/L (ref 98–107)
CO2 SERPL-SCNC: 22 MMOL/L (ref 22–29)
CREAT SERPL-MCNC: 0.55 MG/DL (ref 0.55–1.02)
ERYTHROCYTE [DISTWIDTH] IN BLOOD BY AUTOMATED COUNT: 12.5 % (ref 11.5–17)
GFR SERPLBLD CREATININE-BSD FMLA CKD-EPI: >60 MLS/MIN/1.73/M2
GLOBULIN SER-MCNC: 2.5 GM/DL (ref 2.4–3.5)
GLUCOSE SERPL-MCNC: 76 MG/DL (ref 74–100)
HCT VFR BLD AUTO: 26.8 % (ref 37–47)
HGB BLD-MCNC: 9.3 G/DL (ref 12–16)
MCH RBC QN AUTO: 30 PG (ref 27–31)
MCHC RBC AUTO-ENTMCNC: 34.7 G/DL (ref 33–36)
MCV RBC AUTO: 86.5 FL (ref 80–94)
NRBC BLD AUTO-RTO: 0 %
PLATELET # BLD AUTO: 112 X10(3)/MCL (ref 130–400)
PMV BLD AUTO: 11.9 FL (ref 7.4–10.4)
POTASSIUM SERPL-SCNC: 4.3 MMOL/L (ref 3.5–5.1)
PROT SERPL-MCNC: 4.7 GM/DL (ref 6.4–8.3)
RBC # BLD AUTO: 3.1 X10(6)/MCL (ref 4.2–5.4)
SODIUM SERPL-SCNC: 138 MMOL/L (ref 136–145)
WBC # SPEC AUTO: 6.71 X10(3)/MCL (ref 4.5–11.5)

## 2023-08-13 PROCEDURE — 99238 PR HOSPITAL DISCHARGE DAY,<30 MIN: ICD-10-PCS | Mod: ,,, | Performed by: OBSTETRICS & GYNECOLOGY

## 2023-08-13 PROCEDURE — 25000003 PHARM REV CODE 250: Performed by: OBSTETRICS & GYNECOLOGY

## 2023-08-13 PROCEDURE — 80053 COMPREHEN METABOLIC PANEL: CPT | Performed by: NURSE PRACTITIONER

## 2023-08-13 PROCEDURE — 99238 HOSP IP/OBS DSCHRG MGMT 30/<: CPT | Mod: ,,, | Performed by: OBSTETRICS & GYNECOLOGY

## 2023-08-13 PROCEDURE — 85027 COMPLETE CBC AUTOMATED: CPT | Performed by: NURSE PRACTITIONER

## 2023-08-13 RX ORDER — DOCUSATE SODIUM 100 MG/1
200 CAPSULE, LIQUID FILLED ORAL 2 TIMES DAILY
Qty: 90 CAPSULE | Refills: 0 | Status: SHIPPED | OUTPATIENT
Start: 2023-08-13 | End: 2023-10-05

## 2023-08-13 RX ORDER — IBUPROFEN 800 MG/1
800 TABLET ORAL EVERY 8 HOURS
Qty: 30 TABLET | Refills: 0 | Status: SHIPPED | OUTPATIENT
Start: 2023-08-13 | End: 2023-10-05

## 2023-08-13 RX ORDER — OXYCODONE AND ACETAMINOPHEN 5; 325 MG/1; MG/1
1 TABLET ORAL EVERY 4 HOURS PRN
Qty: 30 EACH | Refills: 0 | Status: SHIPPED | OUTPATIENT
Start: 2023-08-13

## 2023-08-13 RX ADMIN — LEVOTHYROXINE SODIUM 75 MCG: 25 TABLET ORAL at 08:08

## 2023-08-13 RX ADMIN — DOCUSATE SODIUM 200 MG: 100 CAPSULE, LIQUID FILLED ORAL at 08:08

## 2023-08-13 RX ADMIN — IBUPROFEN 800 MG: 800 TABLET, FILM COATED ORAL at 05:08

## 2023-08-13 RX ADMIN — PRENATAL VITAMINS-IRON FUMARATE 27 MG IRON-FOLIC ACID 0.8 MG TABLET 1 TABLET: at 08:08

## 2023-08-13 RX ADMIN — RISPERIDONE 1.5 MG: 1 TABLET ORAL at 08:08

## 2023-08-13 NOTE — NURSING
Spoke with Dr. Garcia and she was okay with canceling [facility] consult due to pt already being established with MD at [facility]. Pt is stable and is on medication for bipolar. She states she will call on 8/14/23 to set up appointment to see her doctor.

## 2023-08-13 NOTE — PROGRESS NOTES
Progress Note  Maternal Fetal Medicine          Subjective:         Antonella Ackerman is a 23 y.o.  s/p C/S at 36w5d due to pre-eclampsia with severe features, POD3.     She is feeling well and has no current complaints. Postpartum course is routine. Labs have shown dramatically improved platelet count and otherwise labs and Bps unremarkable.     No concerns per nursing.       Objective:         Temp:  [98.3 °F (36.8 °C)-98.7 °F (37.1 °C)] 98.4 °F (36.9 °C)  Pulse:  [82-86] 84  Resp:  [18] 18  SpO2:  [96 %] 96 %  BP: (122-138)/(77-84) 129/84    Gen: NAD, A&Ox3  Pulm: Unlabored, CTA  Card: RRR  Abd: FHT present, soft, nondistended, incision not examined  Extremities: Palpable peripheral pulses, 1+ edema    Lab Review  Blood Type: O NEG      Recent Results (from the past 24 hour(s))   CBC Without Differential    Collection Time: 23  9:59 AM   Result Value Ref Range    WBC 6.11 4.50 - 11.50 x10(3)/mcL    RBC 3.21 (L) 4.20 - 5.40 x10(6)/mcL    Hgb 9.8 (L) 12.0 - 16.0 g/dL    Hct 28.0 (L) 37.0 - 47.0 %    MCV 87.2 80.0 - 94.0 fL    MCH 30.5 27.0 - 31.0 pg    MCHC 35.0 33.0 - 36.0 g/dL    RDW 12.6 11.5 - 17.0 %    Platelet 96 (L) 130 - 400 x10(3)/mcL    MPV 12.4 (H) 7.4 - 10.4 fL    NRBC% 0.0 %   Comprehensive Metabolic Panel    Collection Time: 23  9:59 AM   Result Value Ref Range    Sodium Level 142 136 - 145 mmol/L    Potassium Level 3.6 3.5 - 5.1 mmol/L    Chloride 106 98 - 107 mmol/L    Carbon Dioxide 26 22 - 29 mmol/L    Glucose Level 77 74 - 100 mg/dL    Blood Urea Nitrogen 6.2 (L) 7.0 - 18.7 mg/dL    Creatinine 0.69 0.55 - 1.02 mg/dL    Calcium Level Total 8.4 8.4 - 10.2 mg/dL    Protein Total 4.8 (L) 6.4 - 8.3 gm/dL    Albumin Level 2.3 (L) 3.5 - 5.0 g/dL    Globulin 2.5 2.4 - 3.5 gm/dL    Albumin/Globulin Ratio 0.9 (L) 1.1 - 2.0 ratio    Bilirubin Total 0.2 <=1.5 mg/dL    Alkaline Phosphatase 98 40 - 150 unit/L    Alanine Aminotransferase 15 0 - 55 unit/L    Aspartate Aminotransferase 18 5  - 34 unit/L    eGFR >60 mls/min/1.73/m2   Comprehensive Metabolic Panel    Collection Time: 23  3:44 AM   Result Value Ref Range    Sodium Level 138 136 - 145 mmol/L    Potassium Level 4.3 3.5 - 5.1 mmol/L    Chloride 107 98 - 107 mmol/L    Carbon Dioxide 22 22 - 29 mmol/L    Glucose Level 76 74 - 100 mg/dL    Blood Urea Nitrogen 10.0 7.0 - 18.7 mg/dL    Creatinine 0.55 0.55 - 1.02 mg/dL    Calcium Level Total 8.3 (L) 8.4 - 10.2 mg/dL    Protein Total 4.7 (L) 6.4 - 8.3 gm/dL    Albumin Level 2.2 (L) 3.5 - 5.0 g/dL    Globulin 2.5 2.4 - 3.5 gm/dL    Albumin/Globulin Ratio 0.9 (L) 1.1 - 2.0 ratio    Bilirubin Total 0.3 <=1.5 mg/dL    Alkaline Phosphatase 90 40 - 150 unit/L    Alanine Aminotransferase 18 0 - 55 unit/L    Aspartate Aminotransferase 25 5 - 34 unit/L    eGFR >60 mls/min/1.73/m2   CBC Without Differential    Collection Time: 23  3:44 AM   Result Value Ref Range    WBC 6.71 4.50 - 11.50 x10(3)/mcL    RBC 3.10 (L) 4.20 - 5.40 x10(6)/mcL    Hgb 9.3 (L) 12.0 - 16.0 g/dL    Hct 26.8 (L) 37.0 - 47.0 %    MCV 86.5 80.0 - 94.0 fL    MCH 30.0 27.0 - 31.0 pg    MCHC 34.7 33.0 - 36.0 g/dL    RDW 12.5 11.5 - 17.0 %    Platelet 112 (L) 130 - 400 x10(3)/mcL    MPV 11.9 (H) 7.4 - 10.4 fL    NRBC% 0.0 %         Assessment:       23 y.o.  s/p C/S at 36w5d due to pre-eclampsia with severe features, POD3.     Active Hospital Problems    Diagnosis  POA    *Elevated blood pressure affecting pregnancy, antepartum [O16.9]  Unknown      Resolved Hospital Problems   No resolved problems to display.        Plan:     1. Pre-eclampsia  - Delivery occurred at 36w5d due to thrombocytopenia, HA, fetal status change  - S/p 24 hrs PP magnesium  - Continues to have thrombocytopenia- labs are dramatically improved.   - BP normotensive at this time.  - If thrombocytopenia improves >100k, add lovenox ppx (40mg BID) while inpatient only (she will be discharged today no no need to initiate).     2. Pulmonary edema  - Crackles  on exam. S/p IV lasix dose 8/11  - Wean O2 as tolerates, on room air now    3. Hypothyroidism (POA), Anxiety (POA), bipolar (POA)  - Continue home meds for all    She is appropriate for discharge home with routine care. She has been given precautions. She has had her questions answered.     Thank you for allowing us to participate in the care of your patient. If you have any questions/concerns, please do not hesitate to contact us.       Yamila Dhillon  Maternal Fetal Medicine

## 2023-08-13 NOTE — DISCHARGE SUMMARY
Delivery Discharge Summary  Obstetrics      Primary OB Clinician: Emily Zaman MD    Admission date: 2023  Discharge date: 2023    Admit Dx:   Patient Active Problem List   Diagnosis    Idiopathic hypersomnia    Hypothyroidism    Chronic low back pain    1. Hypothyroidism affecting pregnancy in second trimester    3. Bipolar disease during pregnancy in second trimester    2. BMI affecting pregnancy in second trimester    4. POTS (postural orthostatic tachycardia syndrome)    Ear pain, bilateral    5. Fetal arrhythmia affecting pregnancy, antepartum    Elevated blood pressure affecting pregnancy, antepartum     Discharge Dx:   Patient Active Problem List   Diagnosis    Idiopathic hypersomnia    Hypothyroidism    Chronic low back pain    1. Hypothyroidism affecting pregnancy in second trimester    3. Bipolar disease during pregnancy in second trimester    2. BMI affecting pregnancy in second trimester    4. POTS (postural orthostatic tachycardia syndrome)    Ear pain, bilateral    5. Fetal arrhythmia affecting pregnancy, antepartum    Elevated blood pressure affecting pregnancy, antepartum       Procedure: , due to NFHT in severe preeclampsia at 37 weeks    Hospital Course:  Pt is a 23 y.o. now  by , due to NRFHT in setting of preeclampsia with severe features , POD # 3 was admitted on 2023. On initial assessment, vital signs were stable and physical exam was Normal, her labs were abnormal  Patient was subsequently admitted to labor and delivery unit with signed consents.  Patient subsequently delivered a viable infant, please see delivery information below or full delivery note for further details. Pt was in stable condition post delivery and was transferred to the Mother-Baby Unit in a stable condition. Her postpartum course was uncomplicated. On discharge day, patient's pain is well  controlled with oral pain medications. Pt is tolerating ambulation without SOB or CP, and PO  "diet without N/V. Reports lochia is minimal in degree. Denies any HA, vision changes, F/C, LE swelling. Denies any breast pain/soreness. Her platelet trend improved and she was stable for her bipolar disorder. Despite answering EPDS (not valid on day 2) she explained she was being honest about her answering questions and feels her mental health currently "is the best it has been in months" she has dispo plan to meet with therapist and psychiatrist this week.   Pt in stable condition and ready for discharge, instructed to continue PNV, medications as prescribed/recommended, and to follow up in  1-2 weeks for BP check/incision check with primary OB in [facility].       Delivery:    Episiotomy:     Lacerations:     Repair suture:     Repair # of packets:     Blood loss (ml):       Birth information:  YOB: 2021   Time of birth: 8:50 AM   Sex: male   Delivery type: , Low Transverse   Gestational Age: 36w5d     Measurements    Weight: 2835 g  Weight (lbs): 6 lb 4 oz  Length: 49.5 cm  Length (in): 19.5"  Head circumference: 33 cm         Delivery Clinician: Delivery Providers    Delivering clinician: Ale Syed MD          Additional  information:  Forceps:    Vacuum:    Breech:    Observed anomalies      Living?:     Apgars    Living status: Living  Apgar Component Scores:  1 min.:  5 min.:  10 min.:  15 min.:  20 min.:    Skin color:  0  1       Heart rate:  2  2       Reflex irritability:  2  2       Muscle tone:  2  2       Respiratory effort:  2  2       Total:  8  9       Apgars assigned by: BRITTNEY ORELLANA         Placenta: Delivered:       appearance    Patient Instructions:   Current Discharge Medication List        START taking these medications    Details   docusate sodium (COLACE) 100 MG capsule Take 2 capsules (200 mg total) by mouth 2 (two) times daily.  Qty: 90 capsule, Refills: 0      ibuprofen (ADVIL,MOTRIN) 800 MG tablet Take 1 tablet (800 mg total) by mouth every 8 " (eight) hours.  Qty: 30 tablet, Refills: 0      oxyCODONE-acetaminophen (PERCOCET) 5-325 mg per tablet Take 1 tablet by mouth every 4 (four) hours as needed for Pain.  Qty: 30 each, Refills: 0    Comments: Quantity prescribed more than 7 day supply? No           CONTINUE these medications which have NOT CHANGED    Details   busPIRone (BUSPAR) 15 MG tablet Take 15 mg by mouth every evening.      levothyroxine (SYNTHROID) 75 MCG tablet Take 75 mcg by mouth.      melatonin 10 mg Chew Takes 15 mg qhs      pantoprazole (PROTONIX) 20 MG tablet Take 1 tablet (20 mg total) by mouth once daily.  Qty: 30 tablet, Refills: 1      prenatal vit no.124/iron/folic (PRENATAL VITAMIN ORAL) Take by mouth.      !! risperiDONE (RISPERDAL) 0.5 MG Tab Take 1.5 mg by mouth every morning.      !! risperiDONE (RISPERDAL) 3 MG Tab Take 3 mg by mouth nightly.       !! - Potential duplicate medications found. Please discuss with provider.          No discharge procedures on file.      D/C pt. To home in stable condition  Reg diet  Ad yolette activity with pelvic rest x 6 wks  Call for fever >101, heavy vag bleeding, pain uncontrolled w/ pain meds  F/u in office in  wks for final check-up  Medications as prescribed/recommended    Emily Zaman MD  OB Hospitalist  08/13/2023

## 2023-08-14 NOTE — PHYSICIAN QUERY
"PT Name: Antonella Ackerman  MR #: [id number]    DOCUMENTATION CLARIFICATION      CDS/: AMBER Gonzalez,RNC-MNN       Contact information:[email or web address]     This form is a permanent document in the medical record.    Query Date: August 14, 2023    By submitting this query, we are merely seeking further clarification of documentation. Please utilize your independent clinical judgment when addressing the question(s) below.    The medical record contains the following:   Indicators   Supporting Clinical Findings Location in Medical Record   X "Pulmonary Edema" Pulmonary edema Maternal Fetal Medicine progress note 8/11@1027am   X Wheezing, Productive cough, SOB, SNIDER, Use of accessory muscles Crackles on exam Maternal Fetal Medicine progress note 8/11@1027am    Radiology Findings      CHF documented/Respiratory Failure documented      Respiratory condition      RR                  PaO2                  PaCO2                     O2 sat     X Treatment: S/p IV lasix dose 8/11 Maternal Fetal Medicine progress note 8/13@811am   X Supplemental O2: 2L NC on now  Wean O2 as tolerates Maternal Fetal Medicine progress note 8/11@1027am    Oxygen dependence     X Other: Pre-eclampsia Maternal Fetal Medicine progress note 8/11@1027am       Provider, please further specify the acuity of pulmonary edema. Due to pre-eclampsia    [    ] Acute pulmonary edema, non-cardiac cause (please specify causative condition): ___________________   [    ] Acute pulmonary edema, unspecified cause    [   ] Clinically Undetermined     Present on admission (POA) status:  [   ] Yes (Y)                          [   ] Clinically Undetermined (W)  [  x ] No (N)                            [   ] Documentation insufficient to determine if condition is POA (U)       Please document in your progress notes daily for the duration of treatment, until resolved, and include in your discharge summary.                                             "

## 2023-11-22 ENCOUNTER — HOSPITAL ENCOUNTER (EMERGENCY)
Facility: HOSPITAL | Age: 23
Discharge: HOME | End: 2023-11-22
Attending: EMERGENCY MEDICINE
Payer: COMMERCIAL

## 2023-11-22 VITALS
BODY MASS INDEX: 39.27 KG/M2 | DIASTOLIC BLOOD PRESSURE: 82 MMHG | TEMPERATURE: 98 F | OXYGEN SATURATION: 96 % | WEIGHT: 200 LBS | HEART RATE: 98 BPM | SYSTOLIC BLOOD PRESSURE: 135 MMHG | RESPIRATION RATE: 18 BRPM | HEIGHT: 60 IN

## 2023-11-22 DIAGNOSIS — R10.84 GENERALIZED ABDOMINAL PAIN: Primary | ICD-10-CM

## 2023-11-22 LAB
ALBUMIN SERPL-MCNC: 4 G/DL (ref 3.5–5)
ALBUMIN/GLOB SERPL: 1.3 RATIO (ref 1.1–2)
ALP SERPL-CCNC: 55 UNIT/L (ref 40–150)
ALT SERPL-CCNC: 14 UNIT/L (ref 0–55)
APPEARANCE UR: ABNORMAL
AST SERPL-CCNC: 15 UNIT/L (ref 5–34)
B-HCG SERPL QL: NEGATIVE
BACTERIA #/AREA URNS AUTO: ABNORMAL /HPF
BASOPHILS # BLD AUTO: 0.03 X10(3)/MCL
BASOPHILS NFR BLD AUTO: 0.5 %
BILIRUB SERPL-MCNC: 0.2 MG/DL
BILIRUB UR QL STRIP.AUTO: NEGATIVE
BUN SERPL-MCNC: 15 MG/DL (ref 7–18.7)
CALCIUM SERPL-MCNC: 9.7 MG/DL (ref 8.4–10.2)
CHLORIDE SERPL-SCNC: 106 MMOL/L (ref 98–107)
CO2 SERPL-SCNC: 25 MMOL/L (ref 22–29)
COLOR UR AUTO: YELLOW
CREAT SERPL-MCNC: 0.74 MG/DL (ref 0.55–1.02)
EOSINOPHIL # BLD AUTO: 0.19 X10(3)/MCL (ref 0–0.9)
EOSINOPHIL NFR BLD AUTO: 3.4 %
ERYTHROCYTE [DISTWIDTH] IN BLOOD BY AUTOMATED COUNT: 11.8 % (ref 11.5–17)
GFR SERPLBLD CREATININE-BSD FMLA CKD-EPI: >60 MLS/MIN/1.73/M2
GLOBULIN SER-MCNC: 3 GM/DL (ref 2.4–3.5)
GLUCOSE SERPL-MCNC: 111 MG/DL (ref 74–100)
GLUCOSE UR QL STRIP.AUTO: NEGATIVE
HCT VFR BLD AUTO: 37.3 % (ref 37–47)
HGB BLD-MCNC: 12.7 G/DL (ref 12–16)
IMM GRANULOCYTES # BLD AUTO: 0.03 X10(3)/MCL (ref 0–0.04)
IMM GRANULOCYTES NFR BLD AUTO: 0.5 %
KETONES UR QL STRIP.AUTO: ABNORMAL
LEUKOCYTE ESTERASE UR QL STRIP.AUTO: NEGATIVE
LIPASE SERPL-CCNC: 18 U/L
LYMPHOCYTES # BLD AUTO: 1.89 X10(3)/MCL (ref 0.6–4.6)
LYMPHOCYTES NFR BLD AUTO: 33.8 %
MCH RBC QN AUTO: 29.5 PG (ref 27–31)
MCHC RBC AUTO-ENTMCNC: 34 G/DL (ref 33–36)
MCV RBC AUTO: 86.7 FL (ref 80–94)
MONOCYTES # BLD AUTO: 0.47 X10(3)/MCL (ref 0.1–1.3)
MONOCYTES NFR BLD AUTO: 8.4 %
NEUTROPHILS # BLD AUTO: 2.99 X10(3)/MCL (ref 2.1–9.2)
NEUTROPHILS NFR BLD AUTO: 53.4 %
NITRITE UR QL STRIP.AUTO: NEGATIVE
NRBC BLD AUTO-RTO: 0 %
PH UR STRIP.AUTO: 6 [PH]
PLATELET # BLD AUTO: 168 X10(3)/MCL (ref 130–400)
PMV BLD AUTO: 11.5 FL (ref 7.4–10.4)
POTASSIUM SERPL-SCNC: 3.9 MMOL/L (ref 3.5–5.1)
PROT SERPL-MCNC: 7 GM/DL (ref 6.4–8.3)
PROT UR QL STRIP.AUTO: NEGATIVE
RBC # BLD AUTO: 4.3 X10(6)/MCL (ref 4.2–5.4)
RBC #/AREA URNS AUTO: ABNORMAL /HPF
RBC UR QL AUTO: NEGATIVE
SODIUM SERPL-SCNC: 139 MMOL/L (ref 136–145)
SP GR UR STRIP.AUTO: 1.02 (ref 1–1.03)
SQUAMOUS #/AREA URNS AUTO: ABNORMAL /HPF
UROBILINOGEN UR STRIP-ACNC: 0.2
WBC # SPEC AUTO: 5.6 X10(3)/MCL (ref 4.5–11.5)
WBC #/AREA URNS AUTO: ABNORMAL /HPF

## 2023-11-22 PROCEDURE — 25000003 PHARM REV CODE 250: Performed by: EMERGENCY MEDICINE

## 2023-11-22 PROCEDURE — 80053 COMPREHEN METABOLIC PANEL: CPT | Performed by: EMERGENCY MEDICINE

## 2023-11-22 PROCEDURE — 81015 MICROSCOPIC EXAM OF URINE: CPT | Performed by: EMERGENCY MEDICINE

## 2023-11-22 PROCEDURE — 81003 URINALYSIS AUTO W/O SCOPE: CPT | Performed by: EMERGENCY MEDICINE

## 2023-11-22 PROCEDURE — 99284 EMERGENCY DEPT VISIT MOD MDM: CPT

## 2023-11-22 PROCEDURE — 81025 URINE PREGNANCY TEST: CPT | Performed by: EMERGENCY MEDICINE

## 2023-11-22 PROCEDURE — 85025 COMPLETE CBC W/AUTO DIFF WBC: CPT | Performed by: EMERGENCY MEDICINE

## 2023-11-22 PROCEDURE — 83690 ASSAY OF LIPASE: CPT | Performed by: EMERGENCY MEDICINE

## 2023-11-22 RX ORDER — HYOSCYAMINE SULFATE 0.125 MG
125 TABLET ORAL EVERY 4 HOURS PRN
Qty: 12 TABLET | Refills: 0 | Status: SHIPPED | OUTPATIENT
Start: 2023-11-22 | End: 2023-12-22

## 2023-11-22 RX ORDER — ONDANSETRON 4 MG/1
4 TABLET, ORALLY DISINTEGRATING ORAL EVERY 6 HOURS PRN
Qty: 12 TABLET | Refills: 0 | Status: SHIPPED | OUTPATIENT
Start: 2023-11-22

## 2023-11-22 RX ORDER — ONDANSETRON 4 MG/1
4 TABLET, ORALLY DISINTEGRATING ORAL
Status: COMPLETED | OUTPATIENT
Start: 2023-11-22 | End: 2023-11-22

## 2023-11-22 RX ADMIN — ONDANSETRON 4 MG: 4 TABLET, ORALLY DISINTEGRATING ORAL at 06:11

## 2023-11-23 NOTE — ED NOTES
Pt to Ed with c/o brain fog, abd pain, leg pain pt reports she feels like her legs aren't holding her body up, pt ambulated to room with friend assistance. Abd tender/firm to touch, denies dysuria/fever. Last BM yesterday. VSS

## 2023-11-23 NOTE — ED PROVIDER NOTES
Encounter Date: 2023       History     Chief Complaint   Patient presents with    Abdominal Pain     Bilateral abdominal pain that radiates to back since .  Also c/o nausea.       Patient is a 23-year-old female presenting with complain of left flank pain and right flank pain starting yesterday associated with intermittent nausea.  Patient denies any vomiting.  Patient states that the pain radiates to her back.  Patient denies any dysuria.  No vaginal bleeding.  LMP was proximally 2 weeks ago.  Patient denies cough, chest pain, fever or chills.      Review of patient's allergies indicates:   Allergen Reactions    Adhesive tape-silicones Blisters    Penicillins Hives     Other reaction(s): Unknown    Adhesive     Apple juice      Past Medical History:   Diagnosis Date    Anxiety disorder, unspecified     Bipolar disorder, unspecified     Dwarfism, pituitary     Hypothyroidism, unspecified     Narcolepsy     POTS (postural orthostatic tachycardia syndrome)     Small intestinal bacterial overgrowth (SIBO)      Past Surgical History:   Procedure Laterality Date     SECTION N/A 8/10/2023    Procedure:  SECTION;  Surgeon: Ale Syed MD;  [facility];  Service: OB/GYN;  Laterality: N/A;    CHOLECYSTECTOMY      age 17    TONSILLECTOMY, ADENOIDECTOMY      TYMPANOSTOMY TUBE PLACEMENT       Family History   Problem Relation Age of Onset    Hypothyroidism Mother     Hyperlipidemia Mother     Hyperlipidemia Father     Hypertension Father     Brain cancer Brother         Tumor     Social History     Tobacco Use    Smoking status: Never    Smokeless tobacco: Never   Substance Use Topics    Alcohol use: Not Currently    Drug use: Never     Review of Systems   Constitutional:  Negative for chills and fever.   HENT: Negative.     Respiratory: Negative.     Cardiovascular: Negative.    Gastrointestinal:  Positive for nausea. Negative for abdominal distention, abdominal pain, diarrhea and  vomiting.   Genitourinary:  Positive for flank pain. Negative for dysuria, hematuria, vaginal bleeding and vaginal discharge.   Skin: Negative.    Neurological: Negative.    Psychiatric/Behavioral: Negative.         Physical Exam     Initial Vitals [11/22/23 1827]   BP Pulse Resp Temp SpO2   137/87 100 20 98.3 °F (36.8 °C) 95 %      MAP       --         Physical Exam    Nursing note and vitals reviewed.  Constitutional: She appears well-developed and well-nourished.   Patient appears comfortable, she is in no apparent distress.   HENT:   Head: Normocephalic.   Mouth/Throat: Oropharynx is clear and moist.   Neck: Neck supple.   Normal range of motion.  Cardiovascular:  Normal rate, regular rhythm and normal heart sounds.           Pulmonary/Chest: Breath sounds normal. No respiratory distress. She has no wheezes. She has no rhonchi. She has no rales.   Abdominal: Abdomen is soft. She exhibits no distension. There is abdominal tenderness. There is no guarding.   Musculoskeletal:         General: Normal range of motion.      Cervical back: Normal range of motion and neck supple.     Neurological: She is alert and oriented to person, place, and time. She has normal strength.   Skin: Skin is warm.   Psychiatric: She has a normal mood and affect. Her behavior is normal. Thought content normal.         ED Course   Procedures  Labs Reviewed   COMPREHENSIVE METABOLIC PANEL - Abnormal; Notable for the following components:       Result Value    Glucose Level 111 (*)     All other components within normal limits   URINALYSIS, REFLEX TO URINE CULTURE - Abnormal; Notable for the following components:    Appearance, UA Cloudy (*)     Ketones, UA Trace (*)     All other components within normal limits   CBC WITH DIFFERENTIAL - Abnormal; Notable for the following components:    MPV 11.5 (*)     All other components within normal limits   URINALYSIS, MICROSCOPIC - Abnormal; Notable for the following components:    Bacteria, UA Few  (*)     Squamous Epithelial Cells, UA Many (*)     All other components within normal limits   PREGNANCY TEST, URINE RAPID - Normal   LIPASE - Normal   CBC W/ AUTO DIFFERENTIAL    Narrative:     The following orders were created for panel order CBC auto differential.  Procedure                               Abnormality         Status                     ---------                               -----------         ------                     CBC with Differential[[id number]]       Abnormal            Final result                 Please view results for these tests on the individual orders.          Imaging Results    None          Medications   ondansetron disintegrating tablet 4 mg (4 mg Oral Given 11/22/23 1835)     Medical Decision Making  As per HPI.  Differential diagnosis:  Ileus, small-bowel obstruction, UTI, kidney stone, viral syndrome    Amount and/or Complexity of Data Reviewed  Labs: ordered.    Risk  Prescription drug management.               ED Course as of 11/22/23 1916 Wed Nov 22, 2023 1914 Patient remains in no apparent distress.  Will discharge patient to home [KG]      ED Course User Index  [KG] Wei Ayala MD                        Clinical Impression:  Final diagnoses:  [R10.84] Generalized abdominal pain (Primary)          ED Disposition Condition    Discharge Stable          ED Prescriptions       Medication Sig Dispense Start Date End Date Auth. Provider    hyoscyamine (ANASPAZ,LEVSIN) 0.125 mg Tab Take 1 tablet (125 mcg total) by mouth every 4 (four) hours as needed (Abdominal pain). 12 tablet 11/22/2023 12/22/2023 Wei Ayala MD    ondansetron (ZOFRAN-ODT) 4 MG TbDL Take 1 tablet (4 mg total) by mouth every 6 (six) hours as needed (Nausea). 12 tablet 11/22/2023 -- Wei Ayala MD          Follow-up Information       Follow up With Specialties Details Why Contact Info    KENNA Morin Jr., MD Family Medicine  As needed [address] [facility]  [place]  [id number]  [phone]               Wei Ayala MD  11/22/23 1916

## 2024-01-05 PROBLEM — G47.419 PRIMARY NARCOLEPSY WITHOUT CATAPLEXY: Status: ACTIVE | Noted: 2024-01-05

## 2024-01-05 PROBLEM — F41.9 ANXIETY: Status: ACTIVE | Noted: 2024-01-05

## 2024-01-05 PROBLEM — E66.01 MORBID (SEVERE) OBESITY DUE TO EXCESS CALORIES: Status: ACTIVE | Noted: 2024-01-05

## 2024-12-12 NOTE — PROGRESS NOTES
PostPartum Progress Note        Subjective:      Post-Operative Day #1 after  delivery secondary to non reassuring FHT .    Patient is without complaints. Lochia decreasing. Bottle feeding. Pain is well controlled. Patient is not ambulating. She is on bedrest with anderson to gravity while she completes her magnesium therapy for pre E precaution. Tolerating Full Regular diet.Overall mother and baby are doing well.     Objective:      Temp:  [97.7 °F (36.5 °C)-98.3 °F (36.8 °C)] 98 °F (36.7 °C)  Pulse:  [50-82] 76  Resp:  [14-18] 16  SpO2:  [88 %-97 %] 94 %  BP: ()/(48-77) 118/68    Intake/Output Summary (Last 24 hours) at 2023 0842  Last data filed at 2023 0700  Gross per 24 hour   Intake 800 ml   Output 5060 ml   Net -4260 ml     Body mass index is 40.43 kg/m².    General: no acute distress  Abdomen: soft, non-tender, non-distended; Fundus firm and at the umbilicus  Incision covered by pressure dressing that is D&I  Extremities: non-tender, symmetric, 1+ edema    Group & Rh   Date Value Ref Range Status   08/10/2023 O NEG  Final     Recent Results (from the past 336 hour(s))   CBC with Differential    Collection Time: 23  5:28 AM   Result Value Ref Range    WBC 5.31 4.50 - 11.50 x10(3)/mcL    Hgb 9.9 (L) 12.0 - 16.0 g/dL    Hct 28.6 (L) 37.0 - 47.0 %    Platelet 76 (L) 130 - 400 x10(3)/mcL   CBC with Differential    Collection Time: 08/10/23  3:56 PM   Result Value Ref Range    WBC 8.40 4.50 - 11.50 x10(3)/mcL    Hgb 10.4 (L) 12.0 - 16.0 g/dL    Hct 29.7 (L) 37.0 - 47.0 %    Platelet 77 (L) 130 - 400 x10(3)/mcL   CBC with Differential    Collection Time: 08/10/23  4:48 AM   Result Value Ref Range    WBC 6.50 4.50 - 11.50 x10(3)/mcL    Hgb 10.8 (L) 12.0 - 16.0 g/dL    Hct 30.0 (L) 37.0 - 47.0 %    Platelet 78 (L) 130 - 400 x10(3)/mcL          Assessment:     23 y.o.  S/P  Delivery Post-Operative Day #1  - Doing Well      Plan:     1. Continue routine postpartum care  2. Plan  [facility] History and Physical  PCP:  Asuncion Fish MD    Chief Complaint: S/p Fall, diarrhea, and generalized weakness      History Of Present Illness  Shailesh Carey is a 73 year old male, patient of Asuncion Luu MD, and past medical history of A-fib, s/p ablation therapy, hypogammaglobulinemia, hypertension, chronic kidney disease, renal cell cancer, multiple myeloma,  S/p peripheral stem cell transplants.  And currently on oral chemo. Who presented to the ED at [facility] on 12/11/2024 with chief complaints of generalized weakness, diarrhea, and reported that his body gave out and he fell forward onto his face without loss of consciousness.  Patient reported feeling weak and ongoing diarrhea.  Patient currently on chemo for multiple myeloma.    Workup studies studies and ER at [facility] indicated elevated creatinine and the patient received 1 L of IV fluid.  Patient also noted with positive C. difficile.  Patient's family requested the patient to be transferred to [facility] hospital where he is currently receiving his oncology care.    Patient arrived to [facility] hospital today 12/11/2024.    Upon arrival the patient's vitals were  oral temperature is 98.8 °F (37.1 °C). His blood pressure is 149/83 (abnormal) and his pulse is 87. His oxygen saturation is 94%. .  Labs from outside hospital -12/11/2024 -glucose 113, CO2 21, BUN 27, creatinine 2.1, albumin 2.9, hemoglobin 9.4, normal WBC 8.1, lipase 12, magnesium 1.7, lactic acid 2.5, COVID-19 not detected, RSV not detected, C. difficile detected, UA noted with positive protein and blood and WBC 5-10.  See image studies below.  Patient received a liter of fluid on at outside hospital and transitioning to [facility] for further management and treatments.    Patient seen and examined, alert, awake, no oriented x 4, on room air, and endorsed fatigue.  Patient has abrasion wound to his nose bridge with  for D/C  possibly Sunday     no active bleeding.  Patient denies fevers, chills, shortness of breath, chest pain, abdominal pain, nausea, vomiting, or diarrhea.    Review of Systems  Review of Systems   Constitutional:  Positive for activity change, appetite change and fatigue.   HENT: Negative.     Eyes: Negative.    Respiratory: Negative.     Cardiovascular: Negative.    Gastrointestinal:  Positive for diarrhea.   Endocrine: Negative.    Genitourinary: Negative.    Musculoskeletal: Negative.    Skin: Negative.    Neurological:  Positive for weakness.        S/p fall   Hematological: Negative.    Psychiatric/Behavioral: Negative.           Past Medical History  Past Medical History:   Diagnosis Date    Anemia     Atrial fibrillation  (CMD)     CKD (chronic kidney disease)     History of COVID-19     History of peripheral stem cell transplant  (CMD)     HTN (hypertension)     Hypogammaglobulinemia  (CMD)     Lumbar spinal stenosis     Multiple myeloma (CMD) 2017    Osteoarthritis     Osteonecrosis of mandible  (CMD) 2024    Peripheral neuropathy     Renal cell cancer  (CMD)     s/p cryotherapy 2020    S/P ablation of atrial fibrillation 2021        Surgical History  Past Surgical History:   Procedure Laterality Date    Ablation atrial fib - cv  2021    Bone marrow transplant  2018    Cardiac electrophysiology mapping and ablation  2021    Cardioversion  10/08/2021    Hernia repair      Knee surgery      Spine surgery          Social History  Social History     Tobacco Use    Smoking status: Former     Current packs/day: 0.00     Average packs/day: 1 pack/day for 23.0 years (23.0 ttl pk-yrs)     Types: Cigarettes     Start date:      Quit date:      Years since quittin.9    Smokeless tobacco: Never   Vaping Use    Vaping status: never used   Substance Use Topics    Alcohol use: Not Currently     Comment: stopped when dx with cancer    Drug use: Never       Family History    Family History    Problem Relation Age of Onset    Heart disease Mother     Heart disease Father     Tuberculosis Maternal Uncle         Allergies  ALLERGIES:  No Known Allergies    Medications  Medications Prior to Admission   Medication Sig Dispense Refill    metoPROLOL tartrate (LOPRESSOR) 25 MG tablet Take 0.5 tablets by mouth in the morning and 0.5 tablets in the evening. Take half tablet twice daily 90 tablet 3    flecainide (TAMBOCOR) 50 MG tablet TAKE 2 TABLETS BY MOUTH IN THE MORNING AND IN THE EVENING 360 tablet 0    venetoclax (Venclexta) 100 MG tablet Take 4 tablets (400 mg) by mouth with dinner for 21 days, then off for 7 days      Eliquis 5 MG Tab TAKE 1 TABLET BY MOUTH EVERY 12 HOURS 90 tablet 0    dexAMETHasone (DECADRON) 4 MG tablet Take 20 mg by mouth 1 day a week. Thursdays      HYDROcodone-acetaminophen (NORCO)  MG per tablet Take 1 tablet by mouth every 6 hours as needed for Pain.      albuterol 108 (90 Base) MCG/ACT inhaler Inhale 1 puff into the lungs every 4 to 6 hours as needed (shortness of breath/wheezing).      gabapentin (NEURONTIN) 300 MG capsule Take 600 mg by mouth in the morning and 600 mg in the evening.      valACYclovir (VALTREX) 500 MG tablet Take 500 mg by mouth daily.             Last Recorded Vitals  Vitals with min/max:    Vital Last Value 24 Hour Range   Temperature 98.8 °F (37.1 °C) (12/11/24 2129) Temp  Min: 98.8 °F (37.1 °C)  Max: 98.8 °F (37.1 °C)   Pulse 87 (12/11/24 2129) Pulse  Min: 87  Max: 87   Respiratory   No data recorded   Non-Invasive  Blood Pressure (!) 149/83 (12/11/24 2129) BP  Min: 149/83  Max: 149/83   Pulse Oximetry 94 % (12/11/24 2129) SpO2  Min: 94 %  Max: 94 %   Arterial   Blood Pressure   No data recorded        Physical Exam  Constitutional:       General: He is in acute distress.      Appearance: He is well-developed. He is ill-appearing. He is not toxic-appearing or diaphoretic.   HENT:      Head: Normocephalic and atraumatic.      Right Ear: External ear  normal. There is no impacted cerumen.      Left Ear: External ear normal. There is no impacted cerumen.      Nose: Nose normal. No congestion or rhinorrhea.      Mouth/Throat:      Mouth: Mucous membranes are dry.      Pharynx: No oropharyngeal exudate or posterior oropharyngeal erythema.      Neck: Normal range of motion and neck supple. No rigidity.   Eyes:      General: No scleral icterus.        Right eye: No discharge.         Left eye: No discharge.      Conjunctiva/sclera: Conjunctivae normal.      Pupils: Pupils are equal, round, and reactive to light.   Cardiovascular:      Rate and Rhythm: Normal rate and regular rhythm.      Heart sounds: Normal heart sounds. No murmur heard.     No gallop.   Pulmonary:      Effort: Pulmonary effort is normal. No respiratory distress.      Breath sounds: Normal breath sounds. No wheezing.      Comments: On room air, diminished breath sounds.  Abdominal:      General: Bowel sounds are normal. There is no distension.      Palpations: Abdomen is soft.      Tenderness: There is no abdominal tenderness.   Musculoskeletal:         General: Normal range of motion.      Right lower leg: Edema present.      Left lower leg: Edema present.      Comments: Bilateral leg edema trace to +1   Lymphadenopathy:      Cervical: No cervical adenopathy.   Skin:     General: Skin is warm and dry.      Capillary Refill: Capillary refill takes less than 2 seconds.      Coloration: Skin is pale.   Neurological:      Mental Status: He is alert and oriented to person, place, and time.      Motor: Weakness present.      Deep Tendon Reflexes: Reflexes are normal and symmetric.   Psychiatric:         Behavior: Behavior normal.         Thought Content: Thought content normal.         Judgment: Judgment normal.          Imaging  XR CHEST AP OR PA    Result Date: 12/11/2024  HISTORY: FALL TECHNIQUE: XRAY CHEST SINGLE VIEW  were obtained. A preliminary report was provided by the vision radiology service at  the time of the study. That report is available in Epic. COMPARISON: September 3, 2023 Cardiac silhouette is enlarged but stable and in the proper clinical setting pericardial effusion, cardiac decompensation,  or cardiomyopathy would be a consideration. Pulmonary vessels mildly distended. Elevation left hemidiaphragm similar to previous study. There is limited inspiratory effort. There is atelectasis in the lung base. No focal consolidation seen to suggest pneumonia. Arthritic changes present shoulder and spine. Monitoring leads overlie the chest.     IMPRESSION: No pneumonia Electronically Verified and Signed by Attending Radiologist: Shailesh Kerr MD 12/11/2024 8:37 AM    CT FACIAL BONES WO CONTRAST    Result Date: 12/11/2024  EXAMINATION:  CT HEAD W/O CONTRAST, CT MAXILLOFACIAL W/O CONTRAST, CT CERVICAL SPINE W/O CONTRAST HISTORY: FALL; COMPARISON:  None TECHNIQUE:    Unenhanced CT of the head facial bones and cervical spine was performed with multiplanar reformations.  Dose modulation, iterative reconstruction, and/or weight based adjustment of the mA/kV was utilized to reduce the radiation dose to as low as reasonably achievable. FINDINGS: CT HEAD: BRAIN/VENTRICLES: There is no acute intracranial hemorrhage, mass effect or midline shift. No abnormal extra-axial fluid collection. The gray-white differentiation is maintained. There are nonspecific mild relatively symmetric periventricular and deep white matter hypodensities most commonly reflecting chronic ischemic change, in this age group. There are focal hypodensities in both thalami. There is relative dilation of the ventricles, sulci and basal cisterns suggestive of age-related parenchymal loss. SOFT TISSUES/SKULL: Unremarkable visualized skull and soft tissues. CT FACIAL BONES: FACIAL BONES: The maxilla, pterygoid plates and zygomatic arches are intact. The mandible is intact. The mandibular condyles are normally situated. The nasal bones and maxillary nasal  processes are intact. ORBITS: The globes appear intact. The extraocular muscles, optic nerve sheath complexes and lacrimal glands appear unremarkable. No retrobulbar hematoma or mass is seen. The orbital walls and rims are intact. SINUSES/MASTOIDS: The paranasal sinuses and mastoid air cells are well aerated. No acute fracture is seen. SOFT TISSUES: No appreciable facial soft tissue swelling is seen. CT CERVICAL SPINE: BONES: Partial laminectomy changes identified at C3, C4, C5, C6 and C7. No fracture, compression deformity or subluxation. Lateral masses symmetric about the dens. No jumped or locked facets. Transverse foramina intact. DEGENERATIVE: There is multilevel disc disease and spondylotic change, including complete loss of the disc space at C5-C6. There are hypertrophic degenerative changes of the facet and uncovertebral joints throughout the cervical spine including complete fusion of the left C4-C5 facet joint. There are also degenerative changes of the atlantoaxial joint. These findings contribute to mild right neural foraminal stenosis at C2-C3, mild right and severe left neural foraminal stenosis at C3-C4, mild bilateral neural foraminal stenosis at C5-C6 and mild left and moderate right neural foraminal stenosis at C6-C7. Disc osteophyte complex at C3-C4 causes moderate spinal canal stenosis at this level. Disc osteophyte complex at C5-C6 causes mild spinal canal stenosis at this level. OTHER: No prevertebral soft tissue swelling. Visualized lung parenchyma is well-aerated. Thyroid gland is unremarkable.    IMPRESSION: 1.  Focal hypodensities in both thalami suggestive of age-indeterminate, but suspected remote lacunar insults and findings most suggestive of chronic ischemic and senescent changes. If there is concern for acute ischemia, MRI is recommended to further evaluate. 2.  No CT evidence for acute osseous abnormality of the facial bones. 3.  There is multilevel degenerative disc disease and  hypertrophic degenerative changes of the cervical spine, but no CT evidence for acute osseous abnormality of the cervical spine. Electronically Verified and Signed by Attending Radiologist: Smith Blake MD 12/11/2024 3:08 AM This exam was dictated within [facility].    CT CERVICAL SPINE WO CONTRAST    Result Date: 12/11/2024  EXAMINATION:  CT HEAD W/O CONTRAST, CT MAXILLOFACIAL W/O CONTRAST, CT CERVICAL SPINE W/O CONTRAST HISTORY: FALL; COMPARISON:  None TECHNIQUE:    Unenhanced CT of the head facial bones and cervical spine was performed with multiplanar reformations.  Dose modulation, iterative reconstruction, and/or weight based adjustment of the mA/kV was utilized to reduce the radiation dose to as low as reasonably achievable. FINDINGS: CT HEAD: BRAIN/VENTRICLES: There is no acute intracranial hemorrhage, mass effect or midline shift. No abnormal extra-axial fluid collection. The gray-white differentiation is maintained. There are nonspecific mild relatively symmetric periventricular and deep white matter hypodensities most commonly reflecting chronic ischemic change, in this age group. There are focal hypodensities in both thalami. There is relative dilation of the ventricles, sulci and basal cisterns suggestive of age-related parenchymal loss. SOFT TISSUES/SKULL: Unremarkable visualized skull and soft tissues. CT FACIAL BONES: FACIAL BONES: The maxilla, pterygoid plates and zygomatic arches are intact. The mandible is intact. The mandibular condyles are normally situated. The nasal bones and maxillary nasal processes are intact. ORBITS: The globes appear intact. The extraocular muscles, optic nerve sheath complexes and lacrimal glands appear unremarkable. No retrobulbar hematoma or mass is seen. The orbital walls and rims are intact. SINUSES/MASTOIDS: The paranasal sinuses and mastoid air cells are well aerated. No acute fracture is seen. SOFT TISSUES: No appreciable facial soft  tissue swelling is seen. CT CERVICAL SPINE: BONES: Partial laminectomy changes identified at C3, C4, C5, C6 and C7. No fracture, compression deformity or subluxation. Lateral masses symmetric about the dens. No jumped or locked facets. Transverse foramina intact. DEGENERATIVE: There is multilevel disc disease and spondylotic change, including complete loss of the disc space at C5-C6. There are hypertrophic degenerative changes of the facet and uncovertebral joints throughout the cervical spine including complete fusion of the left C4-C5 facet joint. There are also degenerative changes of the atlantoaxial joint. These findings contribute to mild right neural foraminal stenosis at C2-C3, mild right and severe left neural foraminal stenosis at C3-C4, mild bilateral neural foraminal stenosis at C5-C6 and mild left and moderate right neural foraminal stenosis at C6-C7. Disc osteophyte complex at C3-C4 causes moderate spinal canal stenosis at this level. Disc osteophyte complex at C5-C6 causes mild spinal canal stenosis at this level. OTHER: No prevertebral soft tissue swelling. Visualized lung parenchyma is well-aerated. Thyroid gland is unremarkable.    IMPRESSION: 1.  Focal hypodensities in both thalami suggestive of age-indeterminate, but suspected remote lacunar insults and findings most suggestive of chronic ischemic and senescent changes. If there is concern for acute ischemia, MRI is recommended to further evaluate. 2.  No CT evidence for acute osseous abnormality of the facial bones. 3.  There is multilevel degenerative disc disease and hypertrophic degenerative changes of the cervical spine, but no CT evidence for acute osseous abnormality of the cervical spine. Electronically Verified and Signed by Attending Radiologist: Smith Blake MD 12/11/2024 3:08 AM This exam was dictated within [facility].    CT HEAD WO CONTRAST    Result Date: 12/11/2024  EXAMINATION:  CT HEAD W/O CONTRAST,  CT MAXILLOFACIAL W/O CONTRAST, CT CERVICAL SPINE W/O CONTRAST HISTORY: FALL; COMPARISON:  None TECHNIQUE:    Unenhanced CT of the head facial bones and cervical spine was performed with multiplanar reformations.  Dose modulation, iterative reconstruction, and/or weight based adjustment of the mA/kV was utilized to reduce the radiation dose to as low as reasonably achievable. FINDINGS: CT HEAD: BRAIN/VENTRICLES: There is no acute intracranial hemorrhage, mass effect or midline shift. No abnormal extra-axial fluid collection. The gray-white differentiation is maintained. There are nonspecific mild relatively symmetric periventricular and deep white matter hypodensities most commonly reflecting chronic ischemic change, in this age group. There are focal hypodensities in both thalami. There is relative dilation of the ventricles, sulci and basal cisterns suggestive of age-related parenchymal loss. SOFT TISSUES/SKULL: Unremarkable visualized skull and soft tissues. CT FACIAL BONES: FACIAL BONES: The maxilla, pterygoid plates and zygomatic arches are intact. The mandible is intact. The mandibular condyles are normally situated. The nasal bones and maxillary nasal processes are intact. ORBITS: The globes appear intact. The extraocular muscles, optic nerve sheath complexes and lacrimal glands appear unremarkable. No retrobulbar hematoma or mass is seen. The orbital walls and rims are intact. SINUSES/MASTOIDS: The paranasal sinuses and mastoid air cells are well aerated. No acute fracture is seen. SOFT TISSUES: No appreciable facial soft tissue swelling is seen. CT CERVICAL SPINE: BONES: Partial laminectomy changes identified at C3, C4, C5, C6 and C7. No fracture, compression deformity or subluxation. Lateral masses symmetric about the dens. No jumped or locked facets. Transverse foramina intact. DEGENERATIVE: There is multilevel disc disease and spondylotic change, including complete loss of the disc space at C5-C6. There  are hypertrophic degenerative changes of the facet and uncovertebral joints throughout the cervical spine including complete fusion of the left C4-C5 facet joint. There are also degenerative changes of the atlantoaxial joint. These findings contribute to mild right neural foraminal stenosis at C2-C3, mild right and severe left neural foraminal stenosis at C3-C4, mild bilateral neural foraminal stenosis at C5-C6 and mild left and moderate right neural foraminal stenosis at C6-C7. Disc osteophyte complex at C3-C4 causes moderate spinal canal stenosis at this level. Disc osteophyte complex at C5-C6 causes mild spinal canal stenosis at this level. OTHER: No prevertebral soft tissue swelling. Visualized lung parenchyma is well-aerated. Thyroid gland is unremarkable.    IMPRESSION: 1.  Focal hypodensities in both thalami suggestive of age-indeterminate, but suspected remote lacunar insults and findings most suggestive of chronic ischemic and senescent changes. If there is concern for acute ischemia, MRI is recommended to further evaluate. 2.  No CT evidence for acute osseous abnormality of the facial bones. 3.  There is multilevel degenerative disc disease and hypertrophic degenerative changes of the cervical spine, but no CT evidence for acute osseous abnormality of the cervical spine. Electronically Verified and Signed by Attending Radiologist: Smith Blake MD 12/11/2024 3:08 AM This exam was dictated within [facility].     Labs from outside hospital -12/11/2024 -glucose 113, CO2 21, BUN 27, creatinine 2.1, albumin 2.9, hemoglobin 9.4, normal WBC 8.1, lipase 12, magnesium 1.7, lactic acid 2.5, COVID-19 not detected, RSV not detected, C. difficile detected, UA noted with positive protein and blood and WBC 5-10.  See image studies below.  Patient received a liter of fluid on at outside hospital and transitioning to [facility] for further management and treatments.    Assessment and Plan:    S/p  Fall  CT HEAD WO CONTRAST/ CT CERVICAL SPINE WO CONTRAST - Focal hypodensities in both thalami suggestive of age-indeterminate, but suspected remote lacunar insults and findings most suggestive of chronic ischemic and senescent changes. If there is concern for acute ischemia, MRI is recommended to further evaluate. No CT evidence for acute osseous abnormality of the facial bones. There is multilevel degenerative disc disease and hypertrophic degenerative changes of the cervical spine, but no CT evidence for acute osseous abnormality of the cervical spine.   CXR - No pneumonia   Pending EKG   UA noted with positive protein and blood and WBC 5-10.   COVID-19 and RSV not detected   S/p 1 L IV fluid bolus in the ER   Continue neuro checks   Fall precautions   PT/OT   Monitor labs    Facial abrasion following a fall  CT FACIAL BONES WO CONTRAST - Focal hypodensities in both thalami suggestive of age-indeterminate, but suspected remote lacunar insults and findings most suggestive of chronic ischemic and senescent changes. If there is concern for acute ischemia, MRI is recommended to further evaluate. No CT evidence for acute osseous abnormality of the facial bones. 3.  There is multilevel degenerative disc disease and hypertrophic degenerative changes of the cervical spine, but no CT evidence for acute osseous abnormality of the cervical spine.   As needed bacitracin ointment   Monitor closely    C. difficile positive   Outside hospital noted patient with C. difficile positive (12/11)   Started on oral vancomycin   Will consult ID   Maintain contact isolation   Monitor closely    Lactic acidosis   Lactic acid 2.5   WBC 8.1   S/p IV fluid bolus in ER    Or repeat lactic acid    Acute on chronic kidney disease stage III -likely dehydration   Creatinine 2.1 (12/11)    Creatinine 1.59 (02/29)    S/p IV fluid bolus   Continue on IV fluid with LR at 75 cc an hour    May consult nephro if uptrending   Avoid nephrotoxic  drugs   Monitor labs    Generalized weakness   Head CT as above   UA as above   Chest x-ray as above   Pending EKG   Continue IV fluid support   PT/OT   Fall precautions   Monitor labs  Anemia   Hemoglobin 9.4 (12/11)    Hemoglobin -9.6 (02/29)     Monitor labs    Hyperlipidemia   Albumin 2.9 -encourage high-protein diet    Primary Hypertension   BP on arrival -149/83   Takes metoprolol at home, continue   Monitor BP    A-fib S/p ablation of A-fib   Pending EKG    Continue home meds -metoprolol, flecainide, and Eliquis   Monitor heart rates    Hypogammaglobinemia - receives weekly IVIG,      #History of peripheral stem cell transplant  #Multiple myeloma -on oral chemo with Venclexta  #History of renal cancer   Follows Dr. Haskins's outpatient   Oncology consulted    Lumbar stenosis   Takes gabapentin at home -continue    Code Status    Code Status: Full Resuscitation    DVT Prophylaxis    SCDs, Eliquis    Disposition: Pending clinical improvement and consult plan of care    Primary Care Physician  Asuncion Fish MD    The case was reviewed with ALIX Hart   A substantive portion of the medical decision making and plan of care was performed by ALIX Hart CNP    All patient questions answered  All labs and imaging reviewed

## (undated) DEVICE — SYS CLSR DERMABOND PRINEO 22CM

## (undated) DEVICE — SEE MEDLINE ITEM 157117

## (undated) DEVICE — SOL WATER STRL IRR 1000ML

## (undated) DEVICE — SUT 2-0 VICRYL / CT-1

## (undated) DEVICE — SOL NACL IRR 1000ML BTL

## (undated) DEVICE — BINDER ABDOM 4PANEL 12IN LG/XL

## (undated) DEVICE — SEE MEDLINE ITEM 156931

## (undated) DEVICE — Device

## (undated) DEVICE — SUT CHROMIC GUT 2-0 CT-1 27IN

## (undated) DEVICE — CAP BABY BEANIE

## (undated) DEVICE — SUT 0 36IN PDS II VIO MONO

## (undated) DEVICE — SUT CTD VICRYL 0 UND BR CT

## (undated) DEVICE — SUT 3/0 36IN COATED VICRYL

## (undated) DEVICE — GAUZE ADHESIVE LONG STERILE PK

## (undated) DEVICE — ELECTRODE REM PLYHSV RETURN 9

## (undated) DEVICE — PAD SANITARY OB STERILE

## (undated) DEVICE — BULB SYRINGE EAR IRRIGATION

## (undated) DEVICE — PAD UNDERPAD 30X30